# Patient Record
Sex: MALE | Race: WHITE | NOT HISPANIC OR LATINO | URBAN - METROPOLITAN AREA
[De-identification: names, ages, dates, MRNs, and addresses within clinical notes are randomized per-mention and may not be internally consistent; named-entity substitution may affect disease eponyms.]

---

## 2017-02-02 ENCOUNTER — OUTPATIENT (OUTPATIENT)
Dept: OUTPATIENT SERVICES | Facility: HOSPITAL | Age: 67
LOS: 1 days | Discharge: HOME | End: 2017-02-02

## 2017-02-02 DIAGNOSIS — R94.6 ABNORMAL RESULTS OF THYROID FUNCTION STUDIES: ICD-10-CM

## 2017-02-02 DIAGNOSIS — R79.1 ABNORMAL COAGULATION PROFILE: ICD-10-CM

## 2017-02-02 DIAGNOSIS — R78.9 FINDING OF UNSPECIFIED SUBSTANCE, NOT NORMALLY FOUND IN BLOOD: ICD-10-CM

## 2017-02-02 LAB
ALBUMIN SERPL ELPH-MCNC: 4.2 G/DL — SIGNIFICANT CHANGE UP (ref 3.3–5)
ALP SERPL-CCNC: 90 U/L — SIGNIFICANT CHANGE UP (ref 40–120)
ALT FLD-CCNC: 28 U/L — SIGNIFICANT CHANGE UP (ref 12–78)
ANION GAP SERPL CALC-SCNC: 12 MMOL/L — SIGNIFICANT CHANGE UP (ref 5–17)
AST SERPL-CCNC: 16 U/L — SIGNIFICANT CHANGE UP (ref 15–37)
BILIRUB SERPL-MCNC: 0.6 MG/DL — SIGNIFICANT CHANGE UP (ref 0.2–1.2)
BUN SERPL-MCNC: 14 MG/DL — SIGNIFICANT CHANGE UP (ref 7–23)
CALCIUM SERPL-MCNC: 8.7 MG/DL — SIGNIFICANT CHANGE UP (ref 8.5–10.1)
CHLORIDE SERPL-SCNC: 105 MMOL/L — SIGNIFICANT CHANGE UP (ref 96–108)
CHOLEST SERPL-MCNC: 177 MG/DL — SIGNIFICANT CHANGE UP (ref 10–199)
CO2 SERPL-SCNC: 28 MMOL/L — SIGNIFICANT CHANGE UP (ref 22–31)
CREAT SERPL-MCNC: 1.03 MG/DL — SIGNIFICANT CHANGE UP (ref 0.5–1.3)
GLUCOSE SERPL-MCNC: 111 MG/DL — HIGH (ref 70–99)
HBA1C BLD-MCNC: 5.9 % — HIGH (ref 4–5.6)
HDLC SERPL-MCNC: 56 MG/DL — SIGNIFICANT CHANGE UP (ref 40–125)
LIPID PNL WITH DIRECT LDL SERPL: 100 MG/DL — SIGNIFICANT CHANGE UP
POTASSIUM SERPL-MCNC: 3.9 MMOL/L — SIGNIFICANT CHANGE UP (ref 3.5–5.3)
POTASSIUM SERPL-SCNC: 3.9 MMOL/L — SIGNIFICANT CHANGE UP (ref 3.5–5.3)
PROT SERPL-MCNC: 7.5 GM/DL — SIGNIFICANT CHANGE UP (ref 6–8.3)
SODIUM SERPL-SCNC: 145 MMOL/L — SIGNIFICANT CHANGE UP (ref 135–145)
T3FREE SERPL-MCNC: 3.29 PG/ML — SIGNIFICANT CHANGE UP (ref 1.8–4.6)
T4 FREE SERPL-MCNC: 1.2 NG/DL — SIGNIFICANT CHANGE UP (ref 0.9–1.8)
TOTAL CHOLESTEROL/HDL RATIO MEASUREMENT: 3.2 RATIO — LOW (ref 3.4–9.6)
TRIGL SERPL-MCNC: 103 MG/DL — SIGNIFICANT CHANGE UP (ref 10–149)
TSH SERPL-MCNC: 2.47 UIU/ML — SIGNIFICANT CHANGE UP (ref 0.36–3.74)

## 2017-03-31 ENCOUNTER — APPOINTMENT (OUTPATIENT)
Dept: PULMONOLOGY | Facility: CLINIC | Age: 67
End: 2017-03-31

## 2017-03-31 PROBLEM — Z00.00 ENCOUNTER FOR PREVENTIVE HEALTH EXAMINATION: Status: ACTIVE | Noted: 2017-03-31

## 2017-07-20 ENCOUNTER — APPOINTMENT (OUTPATIENT)
Dept: PULMONOLOGY | Facility: CLINIC | Age: 67
End: 2017-07-20

## 2017-07-20 VITALS
DIASTOLIC BLOOD PRESSURE: 90 MMHG | RESPIRATION RATE: 14 BRPM | HEART RATE: 122 BPM | BODY MASS INDEX: 28.74 KG/M2 | OXYGEN SATURATION: 97 % | HEIGHT: 70.5 IN | WEIGHT: 203 LBS | TEMPERATURE: 97.8 F | SYSTOLIC BLOOD PRESSURE: 150 MMHG

## 2017-07-20 DIAGNOSIS — Z84.2 FAMILY HISTORY OF OTHER DISEASES OF THE GENITOURINARY SYSTEM: ICD-10-CM

## 2017-07-20 DIAGNOSIS — Z78.9 OTHER SPECIFIED HEALTH STATUS: ICD-10-CM

## 2017-07-20 DIAGNOSIS — Z86.39 PERSONAL HISTORY OF OTHER ENDOCRINE, NUTRITIONAL AND METABOLIC DISEASE: ICD-10-CM

## 2017-07-20 DIAGNOSIS — I10 ESSENTIAL (PRIMARY) HYPERTENSION: ICD-10-CM

## 2017-07-20 DIAGNOSIS — G47.33 OBSTRUCTIVE SLEEP APNEA (ADULT) (PEDIATRIC): ICD-10-CM

## 2017-07-20 RX ORDER — LOSARTAN POTASSIUM 100 MG/1
100 TABLET, FILM COATED ORAL
Refills: 0 | Status: ACTIVE | COMMUNITY

## 2017-07-20 RX ORDER — ROSUVASTATIN CALCIUM 10 MG/1
10 TABLET, FILM COATED ORAL
Refills: 0 | Status: ACTIVE | COMMUNITY

## 2017-09-29 ENCOUNTER — APPOINTMENT (OUTPATIENT)
Dept: SLEEP CENTER | Facility: CLINIC | Age: 67
End: 2017-09-29
Payer: MEDICARE

## 2017-09-29 PROCEDURE — ZZZZZ: CPT

## 2017-10-27 ENCOUNTER — APPOINTMENT (OUTPATIENT)
Dept: SLEEP CENTER | Facility: CLINIC | Age: 67
End: 2017-10-27
Payer: COMMERCIAL

## 2017-10-27 PROCEDURE — G0399: CPT | Mod: 26

## 2017-10-28 ENCOUNTER — OUTPATIENT (OUTPATIENT)
Dept: OUTPATIENT SERVICES | Facility: HOSPITAL | Age: 67
LOS: 1 days | End: 2017-10-28
Payer: COMMERCIAL

## 2017-10-28 PROCEDURE — G0399: CPT

## 2017-10-31 ENCOUNTER — RESULT CHARGE (OUTPATIENT)
Age: 67
End: 2017-10-31

## 2017-10-31 ENCOUNTER — RESULT REVIEW (OUTPATIENT)
Age: 67
End: 2017-10-31

## 2017-11-01 DIAGNOSIS — G47.33 OBSTRUCTIVE SLEEP APNEA (ADULT) (PEDIATRIC): ICD-10-CM

## 2018-08-17 ENCOUNTER — OUTPATIENT (OUTPATIENT)
Dept: OUTPATIENT SERVICES | Facility: HOSPITAL | Age: 68
LOS: 1 days | Discharge: HOME | End: 2018-08-17

## 2018-08-17 DIAGNOSIS — F41.9 ANXIETY DISORDER, UNSPECIFIED: ICD-10-CM

## 2018-08-17 DIAGNOSIS — R73.01 IMPAIRED FASTING GLUCOSE: ICD-10-CM

## 2018-08-17 DIAGNOSIS — E55.9 VITAMIN D DEFICIENCY, UNSPECIFIED: ICD-10-CM

## 2018-08-17 DIAGNOSIS — I10 ESSENTIAL (PRIMARY) HYPERTENSION: ICD-10-CM

## 2018-08-17 DIAGNOSIS — E78.4 OTHER HYPERLIPIDEMIA: ICD-10-CM

## 2018-08-17 DIAGNOSIS — Z79.899 OTHER LONG TERM (CURRENT) DRUG THERAPY: ICD-10-CM

## 2018-08-17 DIAGNOSIS — R35.1 NOCTURIA: ICD-10-CM

## 2018-08-17 LAB
24R-OH-CALCIDIOL SERPL-MCNC: 81.1 NG/ML — HIGH (ref 30–80)
ALBUMIN SERPL ELPH-MCNC: 3.9 G/DL — SIGNIFICANT CHANGE UP (ref 3.3–5)
ALP SERPL-CCNC: 83 U/L — SIGNIFICANT CHANGE UP (ref 40–120)
ALT FLD-CCNC: 32 U/L — SIGNIFICANT CHANGE UP (ref 12–78)
ANION GAP SERPL CALC-SCNC: 9 MMOL/L — SIGNIFICANT CHANGE UP (ref 5–17)
AST SERPL-CCNC: 9 U/L — LOW (ref 15–37)
BASOPHILS # BLD AUTO: 0.1 K/UL — SIGNIFICANT CHANGE UP (ref 0–0.2)
BASOPHILS NFR BLD AUTO: 1.1 % — SIGNIFICANT CHANGE UP (ref 0–2)
BILIRUB SERPL-MCNC: 0.8 MG/DL — SIGNIFICANT CHANGE UP (ref 0.2–1.2)
BUN SERPL-MCNC: 11 MG/DL — SIGNIFICANT CHANGE UP (ref 7–23)
CALCIUM SERPL-MCNC: 8.8 MG/DL — SIGNIFICANT CHANGE UP (ref 8.5–10.1)
CHLORIDE SERPL-SCNC: 106 MMOL/L — SIGNIFICANT CHANGE UP (ref 96–108)
CHOLEST SERPL-MCNC: 220 MG/DL — HIGH (ref 10–199)
CO2 SERPL-SCNC: 29 MMOL/L — SIGNIFICANT CHANGE UP (ref 22–31)
CREAT SERPL-MCNC: 1.06 MG/DL — SIGNIFICANT CHANGE UP (ref 0.5–1.3)
EOSINOPHIL # BLD AUTO: 0.32 K/UL — SIGNIFICANT CHANGE UP (ref 0–0.5)
EOSINOPHIL NFR BLD AUTO: 3.5 % — SIGNIFICANT CHANGE UP (ref 0–6)
GGT SERPL-CCNC: 32 U/L — SIGNIFICANT CHANGE UP (ref 9–50)
GLUCOSE SERPL-MCNC: 98 MG/DL — SIGNIFICANT CHANGE UP (ref 70–99)
HBA1C BLD-MCNC: 6.2 % — HIGH (ref 4–5.6)
HCT VFR BLD CALC: 49.5 % — SIGNIFICANT CHANGE UP (ref 39–50)
HDLC SERPL-MCNC: 47 MG/DL — SIGNIFICANT CHANGE UP
HGB BLD-MCNC: 16.5 G/DL — SIGNIFICANT CHANGE UP (ref 13–17)
IMM GRANULOCYTES NFR BLD AUTO: 0.2 % — SIGNIFICANT CHANGE UP (ref 0–1.5)
LIPID PNL WITH DIRECT LDL SERPL: 138 MG/DL — HIGH
LYMPHOCYTES # BLD AUTO: 1.7 K/UL — SIGNIFICANT CHANGE UP (ref 1–3.3)
LYMPHOCYTES # BLD AUTO: 18.6 % — SIGNIFICANT CHANGE UP (ref 13–44)
MCHC RBC-ENTMCNC: 29 PG — SIGNIFICANT CHANGE UP (ref 27–34)
MCHC RBC-ENTMCNC: 33.3 GM/DL — SIGNIFICANT CHANGE UP (ref 32–36)
MCV RBC AUTO: 87 FL — SIGNIFICANT CHANGE UP (ref 80–100)
MONOCYTES # BLD AUTO: 0.78 K/UL — SIGNIFICANT CHANGE UP (ref 0–0.9)
MONOCYTES NFR BLD AUTO: 8.5 % — SIGNIFICANT CHANGE UP (ref 2–14)
NEUTROPHILS # BLD AUTO: 6.23 K/UL — SIGNIFICANT CHANGE UP (ref 1.8–7.4)
NEUTROPHILS NFR BLD AUTO: 68.1 % — SIGNIFICANT CHANGE UP (ref 43–77)
NRBC # BLD: 0 /100 WBCS — SIGNIFICANT CHANGE UP (ref 0–0)
PLATELET # BLD AUTO: 374 K/UL — SIGNIFICANT CHANGE UP (ref 150–400)
POTASSIUM SERPL-MCNC: 4.1 MMOL/L — SIGNIFICANT CHANGE UP (ref 3.5–5.3)
POTASSIUM SERPL-SCNC: 4.1 MMOL/L — SIGNIFICANT CHANGE UP (ref 3.5–5.3)
PROT SERPL-MCNC: 7.7 GM/DL — SIGNIFICANT CHANGE UP (ref 6–8.3)
PSA FREE MFR FLD: 4.46 NG/ML — HIGH (ref 0–4)
RBC # BLD: 5.69 M/UL — SIGNIFICANT CHANGE UP (ref 4.2–5.8)
RBC # FLD: 13.8 % — SIGNIFICANT CHANGE UP (ref 10.3–14.5)
SODIUM SERPL-SCNC: 144 MMOL/L — SIGNIFICANT CHANGE UP (ref 135–145)
TOTAL CHOLESTEROL/HDL RATIO MEASUREMENT: 4.7 RATIO — SIGNIFICANT CHANGE UP (ref 3.4–9.6)
TRIGL SERPL-MCNC: 174 MG/DL — HIGH (ref 10–149)
TSH SERPL-MCNC: 1.57 UU/ML — SIGNIFICANT CHANGE UP (ref 0.36–3.74)
WBC # BLD: 9.15 K/UL — SIGNIFICANT CHANGE UP (ref 3.8–10.5)
WBC # FLD AUTO: 9.15 K/UL — SIGNIFICANT CHANGE UP (ref 3.8–10.5)

## 2020-02-07 ENCOUNTER — OUTPATIENT (OUTPATIENT)
Dept: OUTPATIENT SERVICES | Facility: HOSPITAL | Age: 70
LOS: 1 days | Discharge: HOME | End: 2020-02-07

## 2020-02-07 DIAGNOSIS — R79.89 OTHER SPECIFIED ABNORMAL FINDINGS OF BLOOD CHEMISTRY: ICD-10-CM

## 2020-02-07 DIAGNOSIS — R68.89 OTHER GENERAL SYMPTOMS AND SIGNS: ICD-10-CM

## 2020-04-25 ENCOUNTER — MESSAGE (OUTPATIENT)
Age: 70
End: 2020-04-25

## 2020-05-07 LAB
SARS-COV-2 IGG SERPL IA-ACNC: <0.1 INDEX
SARS-COV-2 IGG SERPL QL IA: NEGATIVE

## 2022-08-03 ENCOUNTER — APPOINTMENT (OUTPATIENT)
Dept: SURGERY | Facility: CLINIC | Age: 72
End: 2022-08-03

## 2022-08-03 VITALS
TEMPERATURE: 97.9 F | BODY MASS INDEX: 29.96 KG/M2 | DIASTOLIC BLOOD PRESSURE: 120 MMHG | SYSTOLIC BLOOD PRESSURE: 190 MMHG | HEIGHT: 71 IN | RESPIRATION RATE: 16 BRPM | OXYGEN SATURATION: 98 % | HEART RATE: 124 BPM | WEIGHT: 214 LBS

## 2022-08-03 DIAGNOSIS — Z83.3 FAMILY HISTORY OF DIABETES MELLITUS: ICD-10-CM

## 2022-08-03 DIAGNOSIS — Z84.1 FAMILY HISTORY OF DISORDERS OF KIDNEY AND URETER: ICD-10-CM

## 2022-08-03 DIAGNOSIS — Z82.3 FAMILY HISTORY OF STROKE: ICD-10-CM

## 2022-08-03 DIAGNOSIS — Z80.3 FAMILY HISTORY OF MALIGNANT NEOPLASM OF BREAST: ICD-10-CM

## 2022-08-03 PROCEDURE — 99204 OFFICE O/P NEW MOD 45 MIN: CPT

## 2022-08-03 NOTE — PHYSICAL EXAM
[de-identified] : Obese, soft, nondistended, nontender. No palpable mass or organomegaly. Well-healed upper midline surgical scar. Right groin- no palpable hernia. Left groin- large, bowel containing scrotal hernia which is nontender and only partially reducible.

## 2022-08-03 NOTE — HISTORY OF PRESENT ILLNESS
[de-identified] : Ashley is a 70 y/o male here for consultation of left inguinal hernia.  [de-identified] : 71-year-old male with known left inguinal hernia for almost 20 years. The hernia has progressively enlarged over time, more so within the past 2-3 years. Patient has intermittent, mild local discomfort no generalized abdominal symptoms. Following cholecystectomy many years ago he developed loose bowel movements but within the past several years he has actually transitioned to a degree of constipation.

## 2022-08-03 NOTE — PLAN
[FreeTextEntry1] : Advised elective, inpatient mesh repair via a lower midline approach. Patient to begin weight loss regimen and return in one month for recheck.

## 2022-09-14 ENCOUNTER — APPOINTMENT (OUTPATIENT)
Dept: SURGERY | Facility: CLINIC | Age: 72
End: 2022-09-14

## 2022-09-14 VITALS — SYSTOLIC BLOOD PRESSURE: 190 MMHG | DIASTOLIC BLOOD PRESSURE: 116 MMHG

## 2022-09-14 VITALS
HEART RATE: 134 BPM | SYSTOLIC BLOOD PRESSURE: 183 MMHG | BODY MASS INDEX: 29.12 KG/M2 | WEIGHT: 208 LBS | HEIGHT: 71 IN | DIASTOLIC BLOOD PRESSURE: 133 MMHG | TEMPERATURE: 96.7 F | RESPIRATION RATE: 16 BRPM | OXYGEN SATURATION: 99 %

## 2022-09-14 PROCEDURE — 99212 OFFICE O/P EST SF 10 MIN: CPT

## 2022-09-14 NOTE — HISTORY OF PRESENT ILLNESS
[de-identified] : Ashley is a 70 y/o male here for follow up. Last seen on 08/03/22- partially incarcerated left scrotal hernia. Advised elective, inpatient mesh repair via a lower midline approach. Patient to begin weight loss regimen and return in one month for recheck.  [de-identified] : Since last visit here patient has experienced intermittent, mild left groin discomfort but no acute pain. Tolerating diet without difficulty and maintaining regular bowel activity with use of MiraLax. Has been dieting and has lost approximately 6 pounds since the last visit.

## 2022-09-14 NOTE — ASSESSMENT
[FreeTextEntry1] : 71-year-old obese male with partially chronically incarcerated left scrotal hernia.

## 2022-09-14 NOTE — PHYSICAL EXAM
[de-identified] : Obese, soft, nondistended, nontender. Left scrotal hernia soft and nontender and slightly more reducible than at last exam but still partially incarcerated.

## 2022-10-12 ENCOUNTER — APPOINTMENT (OUTPATIENT)
Dept: SURGERY | Facility: CLINIC | Age: 72
End: 2022-10-12

## 2022-10-12 VITALS
TEMPERATURE: 96.9 F | HEART RATE: 134 BPM | RESPIRATION RATE: 16 BRPM | HEIGHT: 71 IN | BODY MASS INDEX: 28.56 KG/M2 | WEIGHT: 204 LBS | OXYGEN SATURATION: 98 % | DIASTOLIC BLOOD PRESSURE: 89 MMHG | SYSTOLIC BLOOD PRESSURE: 145 MMHG

## 2022-10-12 VITALS
WEIGHT: 202 LBS | DIASTOLIC BLOOD PRESSURE: 89 MMHG | HEIGHT: 71 IN | TEMPERATURE: 96.9 F | HEART RATE: 134 BPM | BODY MASS INDEX: 28.28 KG/M2 | SYSTOLIC BLOOD PRESSURE: 145 MMHG | OXYGEN SATURATION: 98 % | RESPIRATION RATE: 16 BRPM

## 2022-10-12 PROCEDURE — 99212 OFFICE O/P EST SF 10 MIN: CPT

## 2022-10-12 NOTE — PHYSICAL EXAM
[de-identified] : Obese, soft, nondistended, nontender. No palpable mass or organomegaly. Left groin- very large inguinal hernia though seemingly not quite as large as on prior exam. Hernia still not able to be completely reduced but somewhat more so than on last attempt.

## 2022-10-12 NOTE — HISTORY OF PRESENT ILLNESS
[de-identified] : Ashley is a 72 y/o male here for his one month follow up. [de-identified] : Since last visit here patient has lost 5-7 pounds. No complaint of abdominal or groin pain. Notes some constipation which has been readily controlled with use of MiraLax.

## 2022-10-12 NOTE — ASSESSMENT
[FreeTextEntry1] : 71-year-old obese male with large left scrotal hernia who has been pursuing weight loss program with some success.

## 2022-10-12 NOTE — PLAN
[FreeTextEntry1] : Patient to continue weight loss regimen and strongly advised to begin a program of physical activity (suggested daily walking) and then to return in one month for reevaluation.

## 2022-11-16 ENCOUNTER — APPOINTMENT (OUTPATIENT)
Dept: SURGERY | Facility: CLINIC | Age: 72
End: 2022-11-16

## 2022-11-16 VITALS
TEMPERATURE: 96.3 F | OXYGEN SATURATION: 100 % | DIASTOLIC BLOOD PRESSURE: 92 MMHG | WEIGHT: 200 LBS | HEIGHT: 71 IN | BODY MASS INDEX: 28 KG/M2 | HEART RATE: 108 BPM | RESPIRATION RATE: 16 BRPM | SYSTOLIC BLOOD PRESSURE: 160 MMHG

## 2022-11-16 PROCEDURE — 99212 OFFICE O/P EST SF 10 MIN: CPT

## 2022-11-16 NOTE — HISTORY OF PRESENT ILLNESS
[de-identified] : Ashley is a 72 year old male here for a follow up visit for large left scrotal hernia\par \par  [de-identified] : Since last visit here patient has lost another 4 pounds through dieting and some increase in physical activity. No complaint of pain referable to the hernia. Bowel function remains good with daily MiraLax.

## 2022-11-16 NOTE — ASSESSMENT
[FreeTextEntry1] : 72-year-old obese male with large left scrotal hernia progressing well on weight loss regimen.

## 2022-11-16 NOTE — PHYSICAL EXAM
[de-identified] : Soft, nondistended, nontender. Left scrotal hernia appears perhaps slightly smaller than on prior exam and, although not fully reducible, seems to be more so than at last attempt.

## 2022-12-14 ENCOUNTER — APPOINTMENT (OUTPATIENT)
Dept: SURGERY | Facility: CLINIC | Age: 72
End: 2022-12-14

## 2022-12-14 VITALS
HEIGHT: 71 IN | BODY MASS INDEX: 27.86 KG/M2 | OXYGEN SATURATION: 96 % | TEMPERATURE: 96.6 F | DIASTOLIC BLOOD PRESSURE: 100 MMHG | SYSTOLIC BLOOD PRESSURE: 151 MMHG | HEART RATE: 98 BPM | WEIGHT: 199 LBS | RESPIRATION RATE: 16 BRPM

## 2022-12-14 DIAGNOSIS — Z86.79 PERSONAL HISTORY OF OTHER DISEASES OF THE CIRCULATORY SYSTEM: ICD-10-CM

## 2022-12-14 DIAGNOSIS — Z86.39 PERSONAL HISTORY OF OTHER ENDOCRINE, NUTRITIONAL AND METABOLIC DISEASE: ICD-10-CM

## 2022-12-14 DIAGNOSIS — Z87.19 PERSONAL HISTORY OF OTHER DISEASES OF THE DIGESTIVE SYSTEM: ICD-10-CM

## 2022-12-14 DIAGNOSIS — K46.9 UNSPECIFIED ABDOMINAL HERNIA W/OUT OBSTRUCTION OR GANGRENE: ICD-10-CM

## 2022-12-14 PROCEDURE — 99212 OFFICE O/P EST SF 10 MIN: CPT

## 2022-12-14 RX ORDER — FLUOXETINE HCL 10 MG
TABLET ORAL
Refills: 0 | Status: DISCONTINUED | COMMUNITY
End: 2022-12-14

## 2022-12-14 RX ORDER — LANSOPRAZOLE 15 MG
15 SUSPENSION,DELAYED RELEASE,RECONST. ORAL
Refills: 0 | Status: ACTIVE | COMMUNITY

## 2022-12-14 NOTE — HISTORY OF PRESENT ILLNESS
[de-identified] : Ashley is a 72 year old male here for a follow up visit for large left scrotal hernia.  [de-identified] : Since last visit here patient has had no additional success with weight loss. Additionally he has been experiencing intermittent crampy abdominal pain usually alleviated by bowel movements or passage of flatus. No nausea or vomiting and food intake remains normal. Continues to treat constipation with MiraLax as needed.

## 2022-12-14 NOTE — PLAN
[FreeTextEntry1] : To proceed with elective repair in ambulatory OR. Discussed with patient and brother nature of, indications for and risks/benefits of surgery.

## 2022-12-14 NOTE — PHYSICAL EXAM
[de-identified] : Soft, nondistended, nontender. No palpable mass or organomegaly. Left scrotal hernia unchanged from prior exam. Remains partially reducible and nontender.

## 2023-01-18 ENCOUNTER — OUTPATIENT (OUTPATIENT)
Dept: OUTPATIENT SERVICES | Facility: HOSPITAL | Age: 73
LOS: 1 days | End: 2023-01-18
Payer: COMMERCIAL

## 2023-01-18 VITALS
HEIGHT: 70 IN | TEMPERATURE: 97 F | RESPIRATION RATE: 16 BRPM | OXYGEN SATURATION: 97 % | DIASTOLIC BLOOD PRESSURE: 87 MMHG | WEIGHT: 197.09 LBS | HEART RATE: 99 BPM | SYSTOLIC BLOOD PRESSURE: 128 MMHG

## 2023-01-18 DIAGNOSIS — Z01.818 ENCOUNTER FOR OTHER PREPROCEDURAL EXAMINATION: ICD-10-CM

## 2023-01-18 DIAGNOSIS — Z90.49 ACQUIRED ABSENCE OF OTHER SPECIFIED PARTS OF DIGESTIVE TRACT: Chronic | ICD-10-CM

## 2023-01-18 DIAGNOSIS — K40.90 UNILATERAL INGUINAL HERNIA, WITHOUT OBSTRUCTION OR GANGRENE, NOT SPECIFIED AS RECURRENT: ICD-10-CM

## 2023-01-18 DIAGNOSIS — G47.33 OBSTRUCTIVE SLEEP APNEA (ADULT) (PEDIATRIC): ICD-10-CM

## 2023-01-18 LAB
A1C WITH ESTIMATED AVERAGE GLUCOSE RESULT: 6.1 % — HIGH (ref 4–5.6)
ANION GAP SERPL CALC-SCNC: 14 MMOL/L — SIGNIFICANT CHANGE UP (ref 5–17)
BUN SERPL-MCNC: 14 MG/DL — SIGNIFICANT CHANGE UP (ref 7–23)
CALCIUM SERPL-MCNC: 10.2 MG/DL — SIGNIFICANT CHANGE UP (ref 8.4–10.5)
CHLORIDE SERPL-SCNC: 103 MMOL/L — SIGNIFICANT CHANGE UP (ref 96–108)
CO2 SERPL-SCNC: 24 MMOL/L — SIGNIFICANT CHANGE UP (ref 22–31)
CREAT SERPL-MCNC: 0.97 MG/DL — SIGNIFICANT CHANGE UP (ref 0.5–1.3)
EGFR: 83 ML/MIN/1.73M2 — SIGNIFICANT CHANGE UP
ESTIMATED AVERAGE GLUCOSE: 128 MG/DL — HIGH (ref 68–114)
GLUCOSE SERPL-MCNC: 103 MG/DL — HIGH (ref 70–99)
HCT VFR BLD CALC: 53.6 % — HIGH (ref 39–50)
HGB BLD-MCNC: 17.7 G/DL — HIGH (ref 13–17)
MCHC RBC-ENTMCNC: 28.4 PG — SIGNIFICANT CHANGE UP (ref 27–34)
MCHC RBC-ENTMCNC: 33 GM/DL — SIGNIFICANT CHANGE UP (ref 32–36)
MCV RBC AUTO: 85.9 FL — SIGNIFICANT CHANGE UP (ref 80–100)
NRBC # BLD: 0 /100 WBCS — SIGNIFICANT CHANGE UP (ref 0–0)
PLATELET # BLD AUTO: 482 K/UL — HIGH (ref 150–400)
POTASSIUM SERPL-MCNC: 4.2 MMOL/L — SIGNIFICANT CHANGE UP (ref 3.5–5.3)
POTASSIUM SERPL-SCNC: 4.2 MMOL/L — SIGNIFICANT CHANGE UP (ref 3.5–5.3)
RBC # BLD: 6.24 M/UL — HIGH (ref 4.2–5.8)
RBC # FLD: 13.5 % — SIGNIFICANT CHANGE UP (ref 10.3–14.5)
SODIUM SERPL-SCNC: 141 MMOL/L — SIGNIFICANT CHANGE UP (ref 135–145)
WBC # BLD: 8.37 K/UL — SIGNIFICANT CHANGE UP (ref 3.8–10.5)
WBC # FLD AUTO: 8.37 K/UL — SIGNIFICANT CHANGE UP (ref 3.8–10.5)

## 2023-01-18 PROCEDURE — 85027 COMPLETE CBC AUTOMATED: CPT

## 2023-01-18 PROCEDURE — 80048 BASIC METABOLIC PNL TOTAL CA: CPT

## 2023-01-18 PROCEDURE — G0463: CPT

## 2023-01-18 PROCEDURE — 83036 HEMOGLOBIN GLYCOSYLATED A1C: CPT

## 2023-01-18 RX ORDER — SODIUM CHLORIDE 9 MG/ML
1000 INJECTION, SOLUTION INTRAVENOUS
Refills: 0 | Status: DISCONTINUED | OUTPATIENT
Start: 2023-02-02 | End: 2023-02-16

## 2023-01-18 NOTE — H&P PST ADULT - PROBLEM SELECTOR PLAN 1
Left Scrotal hernia repair Morris  PST instructions provided, surgical scrub given, patient verbalized understanding   CBC, BMP, HgA1c collected and sent   Covid PCR near home 1/30  Will schedule PCP eval ( no recent visit , sedentary lifestyle )

## 2023-01-18 NOTE — H&P PST ADULT - LAST ECHOCARDIOGRAM
many year ago was evaluated by cardiologists Dr. Devon Cormier , known Right bundle branch block (RBBB) with left anterior hemiblock , asymptomatic

## 2023-01-18 NOTE — H&P PST ADULT - NSICDXPASTMEDICALHX_GEN_ALL_CORE_FT
PAST MEDICAL HISTORY:  Borderline diabetes mellitus     BPH (benign prostatic hyperplasia)     Hyperlipidemia     Hypertension     Left inguinal hernia     JUAN FRANCISCO (obstructive sleep apnea)     Right bundle branch block (RBBB) with left anterior hemiblock

## 2023-01-18 NOTE — H&P PST ADULT - HISTORY OF PRESENT ILLNESS
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 Chris Mcguire. Patient reports scrotal discomfort, no pain, no obstruction. Denies changes in bowel regimen, taking Miralax every other day.  Denies fever, chills, no acute complaints. Denies Covid exposure. Covid PCR 1/30 near home. Ambulating without assistance.

## 2023-01-18 NOTE — H&P PST ADULT - ASSESSMENT
DASI: 4.64 able to walk, climb some stairs, sedentary lifestyle, not active   Symptoms : Denies SOB, ENNIS, palpitations  Airway : no airway abnormalities , denies prior anesthesia complications   Mallampati : II  Denies loose teeth     Avery abrasion risk : Denies

## 2023-01-18 NOTE — H&P PST ADULT - SKIN/BREAST COMMENTS
bumps and healing erosions on both legs shins . No signs of infection . As per patient healing. Patient reports mild home injury ,moving furniture. Denies falls.

## 2023-01-24 PROBLEM — G47.33 OBSTRUCTIVE SLEEP APNEA (ADULT) (PEDIATRIC): Chronic | Status: ACTIVE | Noted: 2023-01-18

## 2023-01-24 PROBLEM — I45.2 BIFASCICULAR BLOCK: Chronic | Status: ACTIVE | Noted: 2023-01-18

## 2023-01-24 PROBLEM — E78.5 HYPERLIPIDEMIA, UNSPECIFIED: Chronic | Status: ACTIVE | Noted: 2023-01-18

## 2023-01-24 PROBLEM — I10 ESSENTIAL (PRIMARY) HYPERTENSION: Chronic | Status: ACTIVE | Noted: 2023-01-18

## 2023-01-24 PROBLEM — K40.90 UNILATERAL INGUINAL HERNIA, WITHOUT OBSTRUCTION OR GANGRENE, NOT SPECIFIED AS RECURRENT: Chronic | Status: ACTIVE | Noted: 2023-01-18

## 2023-01-24 PROBLEM — N40.0 BENIGN PROSTATIC HYPERPLASIA WITHOUT LOWER URINARY TRACT SYMPTOMS: Chronic | Status: ACTIVE | Noted: 2023-01-18

## 2023-01-24 PROBLEM — R73.03 PREDIABETES: Chronic | Status: ACTIVE | Noted: 2023-01-18

## 2023-01-26 DIAGNOSIS — Z01.818 ENCOUNTER FOR OTHER PREPROCEDURAL EXAMINATION: ICD-10-CM

## 2023-02-01 ENCOUNTER — TRANSCRIPTION ENCOUNTER (OUTPATIENT)
Age: 73
End: 2023-02-01

## 2023-02-01 LAB — SARS-COV-2 N GENE NPH QL NAA+PROBE: NOT DETECTED

## 2023-02-02 ENCOUNTER — OUTPATIENT (OUTPATIENT)
Dept: OUTPATIENT SERVICES | Facility: HOSPITAL | Age: 73
LOS: 1 days | End: 2023-02-02
Payer: COMMERCIAL

## 2023-02-02 ENCOUNTER — TRANSCRIPTION ENCOUNTER (OUTPATIENT)
Age: 73
End: 2023-02-02

## 2023-02-02 ENCOUNTER — RESULT REVIEW (OUTPATIENT)
Age: 73
End: 2023-02-02

## 2023-02-02 ENCOUNTER — APPOINTMENT (OUTPATIENT)
Dept: SURGERY | Facility: HOSPITAL | Age: 73
End: 2023-02-02
Payer: MEDICARE

## 2023-02-02 VITALS
HEIGHT: 70 IN | TEMPERATURE: 97 F | WEIGHT: 197.09 LBS | OXYGEN SATURATION: 97 % | RESPIRATION RATE: 16 BRPM | SYSTOLIC BLOOD PRESSURE: 161 MMHG | HEART RATE: 109 BPM | DIASTOLIC BLOOD PRESSURE: 97 MMHG

## 2023-02-02 VITALS
RESPIRATION RATE: 17 BRPM | SYSTOLIC BLOOD PRESSURE: 121 MMHG | DIASTOLIC BLOOD PRESSURE: 68 MMHG | TEMPERATURE: 98 F | OXYGEN SATURATION: 97 % | HEART RATE: 93 BPM

## 2023-02-02 DIAGNOSIS — Z01.818 ENCOUNTER FOR OTHER PREPROCEDURAL EXAMINATION: ICD-10-CM

## 2023-02-02 DIAGNOSIS — K40.90 UNILATERAL INGUINAL HERNIA, WITHOUT OBSTRUCTION OR GANGRENE, NOT SPECIFIED AS RECURRENT: ICD-10-CM

## 2023-02-02 DIAGNOSIS — Z90.49 ACQUIRED ABSENCE OF OTHER SPECIFIED PARTS OF DIGESTIVE TRACT: Chronic | ICD-10-CM

## 2023-02-02 LAB — GLUCOSE BLDC GLUCOMTR-MCNC: 109 MG/DL — HIGH (ref 70–99)

## 2023-02-02 PROCEDURE — 49525 REPAIR ING HERNIA SLIDING: CPT | Mod: LT

## 2023-02-02 PROCEDURE — 49505 PRP I/HERN INIT REDUC >5 YR: CPT | Mod: LT

## 2023-02-02 PROCEDURE — C1781: CPT

## 2023-02-02 PROCEDURE — 82962 GLUCOSE BLOOD TEST: CPT

## 2023-02-02 PROCEDURE — 88302 TISSUE EXAM BY PATHOLOGIST: CPT | Mod: 26

## 2023-02-02 PROCEDURE — 88302 TISSUE EXAM BY PATHOLOGIST: CPT

## 2023-02-02 PROCEDURE — C9399: CPT

## 2023-02-02 DEVICE — MESH HERNIA VENTRIO OVAL 14X18CM LG: Type: IMPLANTABLE DEVICE | Status: FUNCTIONAL

## 2023-02-02 RX ORDER — OXYCODONE 5 MG/1
5 TABLET ORAL
Qty: 10 | Refills: 0 | Status: ACTIVE | COMMUNITY
Start: 2023-02-02 | End: 1900-01-01

## 2023-02-02 RX ORDER — LIDOCAINE HCL 20 MG/ML
0.2 VIAL (ML) INJECTION ONCE
Refills: 0 | Status: COMPLETED | OUTPATIENT
Start: 2023-02-02 | End: 2023-02-02

## 2023-02-02 RX ORDER — HYDROMORPHONE HYDROCHLORIDE 2 MG/ML
0.25 INJECTION INTRAMUSCULAR; INTRAVENOUS; SUBCUTANEOUS
Refills: 0 | Status: DISCONTINUED | OUTPATIENT
Start: 2023-02-02 | End: 2023-02-02

## 2023-02-02 RX ORDER — ONDANSETRON 8 MG/1
4 TABLET, FILM COATED ORAL ONCE
Refills: 0 | Status: DISCONTINUED | OUTPATIENT
Start: 2023-02-02 | End: 2023-02-16

## 2023-02-02 RX ORDER — SODIUM CHLORIDE 9 MG/ML
1000 INJECTION, SOLUTION INTRAVENOUS
Refills: 0 | Status: DISCONTINUED | OUTPATIENT
Start: 2023-02-02 | End: 2023-02-16

## 2023-02-02 RX ORDER — OXYCODONE HYDROCHLORIDE 5 MG/1
5 TABLET ORAL ONCE
Refills: 0 | Status: DISCONTINUED | OUTPATIENT
Start: 2023-02-02 | End: 2023-02-02

## 2023-02-02 RX ADMIN — SODIUM CHLORIDE 100 MILLILITER(S): 9 INJECTION, SOLUTION INTRAVENOUS at 05:44

## 2023-02-02 NOTE — ASU DISCHARGE PLAN (ADULT/PEDIATRIC) - ASU DC SPECIAL INSTRUCTIONSFT
PAIN CONTROL: You may take 650 mg of Tylenol every 4-6 hours. Do not exceed 4 grams of Tylenol daily. You may take 400-600 mg of Ibuprofen every 4-6 hours. Do not exceed 2400 mg of Ibuprofen daily. Take oxycodone as needed for severe pain, one tab every 6 hours. Do not exceed 4 tabs daily.  WOUND CARE: Keep dressing dry. You have bandages overlying your incisions called steri strips. Do not remove the steri strips. They will fall off on their own and if not, they will be removed in the office. After surgery, some blood may escape from under the tape, which is normal.   DRAIN: You will be discharged with a IBAN drain. You will need to empty it and record outputs accurately. This will be taught to you by the nursing staff. Please do not remove the IBAN drain. It will be removed in the office. Please bring to the office accurate records of output.   BATHING: Do not shower until you follow up with Dr. Mcguire in the office.  ACTIVITY: No heavy lifting or straining. Otherwise, you may return to your usual level of physical activity. If you are taking narcotic pain medication (such as oxycodone), do NOT drive a car, operate machinery or make important decisions.  DIET: Return to your usual diet.  NOTIFY YOUR SURGEON IF: You have any bleeding that does not stop, any pus draining from your wound, any fever (over 100.4 F) or chills, persistent nausea/vomiting, persistent diarrhea, or if your pain is not controlled on your discharge pain medications.  FOLLOW-UP:  1. Follow up with Dr. Mcguire next Wednesday.  2. Please follow up with your primary care physician in one week regarding your hospitalization. PAIN CONTROL: You may take 650 mg of Tylenol every 4-6 hours. Do not exceed 4 grams of Tylenol daily. You may take 400-600 mg of Ibuprofen every 4-6 hours. Do not exceed 2400 mg of Ibuprofen daily. Take oxycodone as needed for severe pain, one tab every 6 hours. Do not exceed 4 tabs daily.  ******************************************************************************************   WOUND CARE: Keep dressing dry. You have bandages overlying your incisions called steri strips. Do not remove the steri strips. They will fall off on their own and if not, they will be removed in the office. After surgery, some blood may escape from under the tape, which is normal.   ******************************************************************************************   DRAIN: You will be discharged with a IBAN drain. You will need to empty it and record outputs accurately. This will be taught to you by the nursing staff. Please do not remove the IBAN drain. It will be removed in the office. Please bring to the office accurate records of output.   ******************************************************************************************   BATHING: Do not shower until you follow up with Dr. Mcguire in the office.  ******************************************************************************************   ACTIVITY: No heavy lifting or straining. Otherwise, you may return to your usual level of physical activity. If you are taking narcotic pain medication (such as oxycodone), do NOT drive a car, operate machinery or make important decisions.  ******************************************************************************************   DIET: Return to your usual diet.  ******************************************************************************************   NOTIFY YOUR SURGEON IF: You have any bleeding that does not stop, any pus draining from your wound, any fever (over 100.4 F) or chills, persistent nausea/vomiting, persistent diarrhea, or if your pain is not controlled on your discharge pain medications.  ******************************************************************************************   FOLLOW-UP:  1. Follow up with Dr. Mcguire next Wednesday. 2. Please follow up with your primary care physician in one week regarding your hospitalization.

## 2023-02-02 NOTE — BRIEF OPERATIVE NOTE - OPERATION/FINDINGS
Large left inguinal hernia with direct and indirect defects. Hernia sacs excised. Preperitoneal repair with Ventrio patch.

## 2023-02-02 NOTE — ASU PATIENT PROFILE, ADULT - SUBSTANCE USE COMMENT, PROFILE
Reason For Visit  MAINOR GANNON is an established  patient here today for a chief complaint of ringing in left ear .          History of Present Illness  Px reports left ear tinnitus x 2 mos. No N/V/D. No SOB or chest pain. No change in LOC. Px reports air travel almost every week. Denies any pain in left ear. Denies that ringining keeps patient awake at night. States that noise is \"high pitched and sounds like the residual ringing you would hear after coming out of a bar with loud music, except that the ringing doesn't stop.\" Denies history of attending loud concerts. Denies working in loud environment. Reports that ringing is able to be drowned out if surrounding noise is louder. States that ringing is only in left ear and not the right. States ringing is worse in the morning upon waking. Denies change in head position. Denies wearing gumaro tooth in left ear. No other concerns.      Review of Systems    Const: Normal.   Allergy & Immunology: Normal.   Eyes: Normal.   ENT: Normal.   Neck: Normal.   CV: Normal.   Resp: Normal.   Breast: Normal.   GI: Normal.   : Normal.   Endo: Normal.   Heme/Lymph: Normal.   Musc: Normal.   Neuro: Normal.   Psych: Normal.   Skin: Normal.       Allergies  No Known Drug Allergies    Current Meds   1. Tamiflu 75 MG Oral Capsule; TAKE 1 CAPSULE TWICE DAILY WITH MEALS;   Therapy: [id number] to (Evaluate:[id number])  Requested for: [id number]; Last   Rx:[id number] Ordered    Active Problems  Influenza B (J10.1)    Past Medical History  History of No significant past medical history    Surgical History  no history of surgery    Family History  No pertinent family history    Social History  Non-smoker (Z78.9)    Vitals  Signs   Recorded: 17Aug2018 02:25PM   Height: 6 ft   Weight: 175 lb   BMI Calculated: 23.73  BSA Calculated: 2.01  Systolic: 112  Diastolic: 72  Temperature: 98 F  Heart Rate: 86  Respiration: 14  O2 Saturation: 98    Physical Exam  Constitutional: alert, in no acute distress and  current vital signs reviewed.   Head and Face ACW: atraumatic, no deformities, normocephalic.   Eyes ACW: no discharge, normal conjunctiva and no eyelid swelling.   ENT ACW: normal appearing outer ear, normal appearing nose. examination of the tympanic membrane showed normal landmarks, normal appearing external canal . Lt TM: 1 fluid bubble noted behind left TM. TM clear. Bony landmarks visualized. No erythema. Minimal cerumen bilat without significant obstruction of TM. nasal mucosa moist and pinkno nasal discharge. normal lips. oral mucosa pink and moist, no oral lesions, normal appearing pharynx and normal appearing tongue.   Lymphatic ACW: no cervical lymphadenopathy.   Pulmonary ACW: no respiratory distress, normal respiratory rate and effort and no accessory muscle use. breath sounds clear to auscultation bilaterally.   Cardiovascular: normal rate, no murmurs were heard, regular rhythm, normal S1 and normal S2. edema was not present in the lower extremities.   Neurologic ACW: cranial nerves grossly intact. no sensory deficits noted. no coordination deficits. normal gait. muscle strength and tone were normal.      Assessment  Tinnitus of left ear (H93.12)   · Assessed By: DONAVON REBOLLEDO (Primary Care); Last Assessed: 17 Aug 2018    Plan      Follow up with PCP as needed. Advised for possible ENT follow up for more extensive hearing tests if no change noted with current regimen of Flonase and Claritin. Begin otc Claritin 1/day to dry up some of the fluid in the left ear.  Additional Notes:   Patient expresses understanding of the plan.    Medical compliance with plan discussed and risks of non-compliance reviewed.    If symptoms continue or worsen after 24 hrs, seek immediate care.   Thank you for visiting [facility]. You require a follow-up visit. Please schedule an appointment with your PCP. .       To view an electronic version of your office visit summary, please access your [facility]  Portal account. If you are not currently signed up and you provided us with your email address, please locate the email from \"[facility]\" and follow instructions to activate your account. Or you can visit [email or web address] to submit an online portal request form.              Signatures   Electronically signed by : LUCIANO Diane; Aug 17 2018  2:28PM CST    Electronically signed by : DONAVON REBOLLEDO NP; Aug 17 2018  5:03PM CST     Denies

## 2023-02-02 NOTE — ASU DISCHARGE PLAN (ADULT/PEDIATRIC) - PATIENT BELONGINGS
Semglee ordered at [facility].  Entire message reviewed with Pranay who states he has 2 pens of Basaglar to use then he will switch to Semglee.   Patient's belongings returned

## 2023-02-02 NOTE — ASU DISCHARGE PLAN (ADULT/PEDIATRIC) - NS MD DC FALL RISK RISK
For information on Fall & Injury Prevention, visit: [email or web address] OR  [email or web address] OR  https://www.cdc.gov/steadi/patient.html

## 2023-02-02 NOTE — ASU PATIENT PROFILE, ADULT - HOW PATIENT ADDRESSED, PROFILE
Pt with chronic constipation, on miralax. stopped taking it one week ago because he ran out and it is no longer covereed by insurance. c/o no bm for 5 days, no abd pain, no distention. no urge to go. no fever no chills. he noted an itchy rash on back 2 days. using new laundry service. no new sob (has chronic secondary to lopez) no cp. no other rashes.
Ashley

## 2023-02-02 NOTE — ASU DISCHARGE PLAN (ADULT/PEDIATRIC) - CARE PROVIDER_API CALL
Chris Mcguire)  Surgery  [address], [address]  [place], [place] [id number]  Phone: [phone]  Fax: [phone]  Follow Up Time:

## 2023-02-02 NOTE — ASU DISCHARGE PLAN (ADULT/PEDIATRIC) - NURSING INSTRUCTIONS
OK to take Tylenol/Acetaminophen at 1:30PM TODAY 2/2 (last dose @  7:30AM   in operating room)  for pain and every 6 hours after as needed. OK to take Motrin/Ibuprofen at ANY TIME TODAY 2/2 for pain and every 6 hours after as needed.

## 2023-02-08 ENCOUNTER — APPOINTMENT (OUTPATIENT)
Dept: SURGERY | Facility: CLINIC | Age: 73
End: 2023-02-08
Payer: MEDICARE

## 2023-02-08 VITALS
OXYGEN SATURATION: 100 % | HEIGHT: 71 IN | SYSTOLIC BLOOD PRESSURE: 185 MMHG | HEART RATE: 121 BPM | DIASTOLIC BLOOD PRESSURE: 122 MMHG | TEMPERATURE: 95.7 F | RESPIRATION RATE: 16 BRPM

## 2023-02-08 PROCEDURE — 99212 OFFICE O/P EST SF 10 MIN: CPT

## 2023-02-14 LAB — SURGICAL PATHOLOGY STUDY: SIGNIFICANT CHANGE UP

## 2023-02-22 ENCOUNTER — APPOINTMENT (OUTPATIENT)
Dept: SURGERY | Facility: CLINIC | Age: 73
End: 2023-02-22
Payer: MEDICARE

## 2023-02-22 VITALS
WEIGHT: 199 LBS | SYSTOLIC BLOOD PRESSURE: 198 MMHG | RESPIRATION RATE: 16 BRPM | TEMPERATURE: 96 F | DIASTOLIC BLOOD PRESSURE: 103 MMHG | HEART RATE: 98 BPM | BODY MASS INDEX: 27.86 KG/M2 | OXYGEN SATURATION: 99 % | HEIGHT: 71 IN

## 2023-02-22 PROCEDURE — 99024 POSTOP FOLLOW-UP VISIT: CPT

## 2023-02-22 NOTE — HISTORY OF PRESENT ILLNESS
[de-identified] : Ashley LEE is a 71 y/o male here for a two week  post-op visit. S/p Left scrotal hernia repair with Ventrio patch 02-Feb-2023 [de-identified] : Status post left scrotal hernia repair on 2/2/2023.  At this point has mild left groin discomfort and some posterior left testicular soreness.

## 2023-02-22 NOTE — PLAN
[FreeTextEntry1] : Patient to gradually liberalize activities as tolerated and return in 1 month for recheck.

## 2023-02-22 NOTE — HISTORY OF PRESENT ILLNESS
[de-identified] : Ashley LEE is a 71 y/o male here for a post-op visit. S/p Open repair of inguinal hernia using mesh in adult 02-Feb-2023 [de-identified] : Status post left scrotal hernia repair on 2/2/2023.  At present has no complaint of pain.  Drain output less than 25 cc/day for last several days.

## 2023-02-22 NOTE — PHYSICAL EXAM
[de-identified] : Soft, nondistended, nontender.  Left groin incision clean and healing very well with faint ridge.  Repair fully intact.  No palpable seroma or signs of infection.  Left testicle nontender.  Moderate degree of left scrotal edema.

## 2023-03-22 ENCOUNTER — APPOINTMENT (OUTPATIENT)
Dept: SURGERY | Facility: CLINIC | Age: 73
End: 2023-03-22
Payer: MEDICARE

## 2023-03-22 VITALS
DIASTOLIC BLOOD PRESSURE: 96 MMHG | RESPIRATION RATE: 18 BRPM | HEART RATE: 97 BPM | SYSTOLIC BLOOD PRESSURE: 170 MMHG | TEMPERATURE: 97.8 F | OXYGEN SATURATION: 98 %

## 2023-03-22 PROCEDURE — 99024 POSTOP FOLLOW-UP VISIT: CPT

## 2023-03-22 NOTE — PLAN
[FreeTextEntry1] : Patient to liberalize activities as tolerated and return here in approximately 4 months for recheck.

## 2023-03-22 NOTE — ASSESSMENT
[FreeTextEntry1] : Status post left scrotal hernia repair; normal postop course except for some element scrotal seroma which appears to be slowly resolving.

## 2023-03-22 NOTE — PHYSICAL EXAM
[de-identified] : Soft, nondistended, nontender.  Left groin incision clean and healing very well with resolving ridge.  Repair fully intact.  Contained area of swelling and induration within the proximal scrotum significantly smaller than at last exam but still quite firm.  No signs of infection.  Left testicle normal.

## 2023-03-22 NOTE — HISTORY OF PRESENT ILLNESS
[de-identified] : Ashley  is a 73 y/o male here for a two week post-op visit. S/p Left scrotal hernia repair with Ventrio patch 02-Feb-2023. \par  [de-identified] : Status post left scrotal hernia repair on 2/2/2023.  At present has no complaint of pain.  Swelling has decreased significantly though not completely resolved.

## 2023-06-07 NOTE — ASU PATIENT PROFILE, ADULT - FALL HARM RISK - UNIVERSAL INTERVENTIONS
Bed in lowest position, wheels locked, appropriate side rails in place/Call bell, personal items and telephone in reach/Instruct patient to call for assistance before getting out of bed or chair/Non-slip footwear when patient is out of bed/[place] to call system/Physically safe environment - no spills, clutter or unnecessary equipment/Purposeful Proactive Rounding/Room/bathroom lighting operational, light cord in reach Ear Star Wedge Flap Text: The defect edges were debeveled with a #15 blade scalpel.  Given the location of the defect and the proximity to free margins (helical rim) an ear star wedge flap was deemed most appropriate.  Using a sterile surgical marker, the appropriate flap was drawn incorporating the defect and placing the expected incisions between the helical rim and antihelix where possible.  The area thus outlined was incised through and through with a #15 scalpel blade.

## 2023-07-19 ENCOUNTER — APPOINTMENT (OUTPATIENT)
Dept: SURGERY | Facility: CLINIC | Age: 73
End: 2023-07-19
Payer: MEDICARE

## 2023-07-19 VITALS
DIASTOLIC BLOOD PRESSURE: 109 MMHG | RESPIRATION RATE: 18 BRPM | WEIGHT: 198 LBS | TEMPERATURE: 96.4 F | SYSTOLIC BLOOD PRESSURE: 166 MMHG | BODY MASS INDEX: 27.72 KG/M2 | OXYGEN SATURATION: 99 % | HEART RATE: 113 BPM | HEIGHT: 71 IN

## 2023-07-19 DIAGNOSIS — K40.90 UNILATERAL INGUINAL HERNIA, W/OUT OBSTRUCTION OR GANGRENE, NOT SPECIFIED AS RECURRENT: ICD-10-CM

## 2023-07-19 PROCEDURE — 99212 OFFICE O/P EST SF 10 MIN: CPT

## 2023-07-19 NOTE — PHYSICAL EXAM
Report received by Carol and resumed care of infant. 12 hour chart check/review also completed at this time.   [de-identified] : Soft, nondistended, nontender.  Left groin incision well-healed.  Repair fully intact.  Left scrotal edema completely resolved and left testicle normal.  Small residual seroma at the level of the external inguinal ring, approximately 2.5 cm in diameter, nontender.  No signs of infection.

## 2023-07-19 NOTE — HISTORY OF PRESENT ILLNESS
[de-identified] : Ashley is a 73 y/o male here for a 4 month follow up visit. S/p Left scrotal hernia repair with Ventrio patch 02-Feb-2023 [de-identified] : Since his last visit here patient has done quite well.  He is back to full range of normal activities and has no complaint of pain.

## 2023-07-25 NOTE — ASU PATIENT PROFILE, ADULT - ALCOHOL USE HISTORY SINGLE SELECT
yes...
Communicate Risk of Fall with Harm to all staff/Reinforce activity limits and safety measures with patient and family/Tailored Fall Risk Interventions/Visual Cue: Yellow wristband and red socks/Bed in lowest position, wheels locked, appropriate side rails in place/Call bell, personal items and telephone in reach/Instruct patient to call for assistance before getting out of bed or chair/Non-slip footwear when patient is out of bed/[place] to call system/Physically safe environment - no spills, clutter or unnecessary equipment/Purposeful Proactive Rounding/Room/bathroom lighting operational, light cord in reach

## 2024-03-25 ENCOUNTER — APPOINTMENT (OUTPATIENT)
Dept: SURGERY | Facility: CLINIC | Age: 74
End: 2024-03-25
Payer: MEDICARE

## 2024-03-25 VITALS
TEMPERATURE: 98.7 F | HEIGHT: 70 IN | HEART RATE: 96 BPM | RESPIRATION RATE: 17 BRPM | DIASTOLIC BLOOD PRESSURE: 106 MMHG | WEIGHT: 210 LBS | BODY MASS INDEX: 30.06 KG/M2 | OXYGEN SATURATION: 99 % | SYSTOLIC BLOOD PRESSURE: 186 MMHG

## 2024-03-25 DIAGNOSIS — K40.90 UNILATERAL INGUINAL HERNIA, W/OUT OBSTRUCTION OR GANGRENE, NOT SPECIFIED AS RECURRENT: ICD-10-CM

## 2024-03-25 DIAGNOSIS — G47.33 OBSTRUCTIVE SLEEP APNEA (ADULT) (PEDIATRIC): ICD-10-CM

## 2024-03-25 PROCEDURE — 99214 OFFICE O/P EST MOD 30 MIN: CPT

## 2024-03-25 NOTE — PHYSICAL EXAM
[Normal Breath Sounds] : Normal breath sounds [Normal Heart Sounds] : normal heart sounds [No Rash or Lesion] : No rash or lesion [No HSM] : no hepatosplenomegaly [Alert] : alert [Oriented to Place] : oriented to place [Oriented to Person] : oriented to person [Oriented to Time] : oriented to time [Calm] : calm [JVD] : no jugular venous distention  [Abdominal Masses] : No abdominal masses [Abdomen Tenderness] : ~T ~M No abdominal tenderness [de-identified] : no acute distress noted [de-identified] : NC/AT [de-identified] : Large Right inguinal hernia with scrotal involvment. Well healed left inguinal hernia incision [de-identified] : Difficult exam but cord and testicles bilaterally seem present

## 2024-03-25 NOTE — ASSESSMENT
[FreeTextEntry1] : Ashley is a 74yo M with a large right inguinal hernia, that is now symptomatic. There are no signs of strangulation or obstruction, but it is incarcerated. He will need right inguinal hernai repair with mesh we have also discussed possible need for orchiectomy on that right side given the size of the hernia and the placement of a drain which could be present for a number of weeks

## 2024-03-25 NOTE — HISTORY OF PRESENT ILLNESS
[de-identified] : MATA  is a 73 year  male  here for a consultation for possible right inguinal hernia.  He is a 72yo M with history of HTN, HLD s/p Left inguinal hernia repair with mesh with Dr. Chris Calloway 2/2023. Since then he has noticed that his right inguinal hernia has slowly increased in size to the point that it is now bulging out significantly. HE states his left side has healed well and has not had any issues. He denies any nausea and vomiting with his hernia. Has bene having normal bowel movements

## 2024-04-18 RX ORDER — AMLODIPINE BESYLATE 2.5 MG/1
1 TABLET ORAL
Refills: 0 | DISCHARGE
Start: 2024-04-18

## 2024-04-23 ENCOUNTER — INPATIENT (INPATIENT)
Facility: HOSPITAL | Age: 74
LOS: 2 days | Discharge: HOME | DRG: 310 | End: 2024-04-26
Attending: INTERNAL MEDICINE | Admitting: INTERNAL MEDICINE
Payer: COMMERCIAL

## 2024-04-23 VITALS
DIASTOLIC BLOOD PRESSURE: 134 MMHG | HEIGHT: 70 IN | SYSTOLIC BLOOD PRESSURE: 189 MMHG | WEIGHT: 207.9 LBS | OXYGEN SATURATION: 98 % | HEART RATE: 164 BPM | TEMPERATURE: 98 F | RESPIRATION RATE: 20 BRPM

## 2024-04-23 DIAGNOSIS — Z90.49 ACQUIRED ABSENCE OF OTHER SPECIFIED PARTS OF DIGESTIVE TRACT: Chronic | ICD-10-CM

## 2024-04-23 DIAGNOSIS — I47.19 OTHER SUPRAVENTRICULAR TACHYCARDIA: ICD-10-CM

## 2024-04-23 LAB
ADD ON TEST-SPECIMEN IN LAB: SIGNIFICANT CHANGE UP
ALBUMIN SERPL ELPH-MCNC: 5.3 G/DL — HIGH (ref 3.3–5)
ALP SERPL-CCNC: 97 U/L — SIGNIFICANT CHANGE UP (ref 40–120)
ALT FLD-CCNC: 21 U/L — SIGNIFICANT CHANGE UP (ref 10–45)
ANION GAP SERPL CALC-SCNC: 18 MMOL/L — HIGH (ref 5–17)
APPEARANCE UR: CLEAR — SIGNIFICANT CHANGE UP
AST SERPL-CCNC: 28 U/L — SIGNIFICANT CHANGE UP (ref 10–40)
BASE EXCESS BLDV CALC-SCNC: 1.4 MMOL/L — SIGNIFICANT CHANGE UP (ref -2–3)
BASOPHILS # BLD AUTO: 0.09 K/UL — SIGNIFICANT CHANGE UP (ref 0–0.2)
BASOPHILS NFR BLD AUTO: 0.7 % — SIGNIFICANT CHANGE UP (ref 0–2)
BILIRUB SERPL-MCNC: 1.1 MG/DL — SIGNIFICANT CHANGE UP (ref 0.2–1.2)
BILIRUB UR-MCNC: NEGATIVE — SIGNIFICANT CHANGE UP
BUN SERPL-MCNC: 16 MG/DL — SIGNIFICANT CHANGE UP (ref 7–23)
CA-I SERPL-SCNC: 1.23 MMOL/L — SIGNIFICANT CHANGE UP (ref 1.15–1.33)
CALCIUM SERPL-MCNC: 10.3 MG/DL — SIGNIFICANT CHANGE UP (ref 8.4–10.5)
CHLORIDE BLDV-SCNC: 102 MMOL/L — SIGNIFICANT CHANGE UP (ref 96–108)
CHLORIDE SERPL-SCNC: 101 MMOL/L — SIGNIFICANT CHANGE UP (ref 96–108)
CO2 BLDV-SCNC: 29 MMOL/L — HIGH (ref 22–26)
CO2 SERPL-SCNC: 22 MMOL/L — SIGNIFICANT CHANGE UP (ref 22–31)
COLOR SPEC: YELLOW — SIGNIFICANT CHANGE UP
CREAT SERPL-MCNC: 1.08 MG/DL — SIGNIFICANT CHANGE UP (ref 0.5–1.3)
DIFF PNL FLD: NEGATIVE — SIGNIFICANT CHANGE UP
EGFR: 72 ML/MIN/1.73M2 — SIGNIFICANT CHANGE UP
EOSINOPHIL # BLD AUTO: 0.2 K/UL — SIGNIFICANT CHANGE UP (ref 0–0.5)
EOSINOPHIL NFR BLD AUTO: 1.6 % — SIGNIFICANT CHANGE UP (ref 0–6)
GAS PNL BLDV: 136 MMOL/L — SIGNIFICANT CHANGE UP (ref 136–145)
GAS PNL BLDV: SIGNIFICANT CHANGE UP
GAS PNL BLDV: SIGNIFICANT CHANGE UP
GLUCOSE BLDV-MCNC: 139 MG/DL — HIGH (ref 70–99)
GLUCOSE SERPL-MCNC: 135 MG/DL — HIGH (ref 70–99)
GLUCOSE UR QL: NEGATIVE MG/DL — SIGNIFICANT CHANGE UP
HCO3 BLDV-SCNC: 28 MMOL/L — SIGNIFICANT CHANGE UP (ref 22–29)
HCT VFR BLD CALC: 53.2 % — HIGH (ref 39–50)
HCT VFR BLDA CALC: 54 % — HIGH (ref 39–51)
HGB BLD CALC-MCNC: 18.1 G/DL — HIGH (ref 12.6–17.4)
HGB BLD-MCNC: 18.1 G/DL — HIGH (ref 13–17)
IMM GRANULOCYTES NFR BLD AUTO: 0.5 % — SIGNIFICANT CHANGE UP (ref 0–0.9)
KETONES UR-MCNC: 15 MG/DL
LACTATE BLDV-MCNC: 2.5 MMOL/L — HIGH (ref 0.5–2)
LEUKOCYTE ESTERASE UR-ACNC: NEGATIVE — SIGNIFICANT CHANGE UP
LYMPHOCYTES # BLD AUTO: 1.77 K/UL — SIGNIFICANT CHANGE UP (ref 1–3.3)
LYMPHOCYTES # BLD AUTO: 14.2 % — SIGNIFICANT CHANGE UP (ref 13–44)
MCHC RBC-ENTMCNC: 29 PG — SIGNIFICANT CHANGE UP (ref 27–34)
MCHC RBC-ENTMCNC: 34 GM/DL — SIGNIFICANT CHANGE UP (ref 32–36)
MCV RBC AUTO: 85.1 FL — SIGNIFICANT CHANGE UP (ref 80–100)
MONOCYTES # BLD AUTO: 1.26 K/UL — HIGH (ref 0–0.9)
MONOCYTES NFR BLD AUTO: 10.1 % — SIGNIFICANT CHANGE UP (ref 2–14)
NEUTROPHILS # BLD AUTO: 9.11 K/UL — HIGH (ref 1.8–7.4)
NEUTROPHILS NFR BLD AUTO: 72.9 % — SIGNIFICANT CHANGE UP (ref 43–77)
NITRITE UR-MCNC: NEGATIVE — SIGNIFICANT CHANGE UP
NRBC # BLD: 0 /100 WBCS — SIGNIFICANT CHANGE UP (ref 0–0)
NT-PROBNP SERPL-SCNC: 46 PG/ML — SIGNIFICANT CHANGE UP (ref 0–300)
PCO2 BLDV: 48 MMHG — SIGNIFICANT CHANGE UP (ref 42–55)
PH BLDV: 7.37 — SIGNIFICANT CHANGE UP (ref 7.32–7.43)
PH UR: 6.5 — SIGNIFICANT CHANGE UP (ref 5–8)
PLATELET # BLD AUTO: 484 K/UL — HIGH (ref 150–400)
PO2 BLDV: 26 MMHG — SIGNIFICANT CHANGE UP (ref 25–45)
POTASSIUM BLDV-SCNC: 4.1 MMOL/L — SIGNIFICANT CHANGE UP (ref 3.5–5.1)
POTASSIUM SERPL-MCNC: 4.3 MMOL/L — SIGNIFICANT CHANGE UP (ref 3.5–5.3)
POTASSIUM SERPL-SCNC: 4.3 MMOL/L — SIGNIFICANT CHANGE UP (ref 3.5–5.3)
PROT SERPL-MCNC: 7.8 G/DL — SIGNIFICANT CHANGE UP (ref 6–8.3)
PROT UR-MCNC: SIGNIFICANT CHANGE UP MG/DL
RBC # BLD: 6.25 M/UL — HIGH (ref 4.2–5.8)
RBC # FLD: 13.3 % — SIGNIFICANT CHANGE UP (ref 10.3–14.5)
SAO2 % BLDV: 46.2 % — LOW (ref 67–88)
SODIUM SERPL-SCNC: 141 MMOL/L — SIGNIFICANT CHANGE UP (ref 135–145)
SP GR SPEC: 1.02 — SIGNIFICANT CHANGE UP (ref 1–1.03)
TROPONIN T, HIGH SENSITIVITY RESULT: 20 NG/L — SIGNIFICANT CHANGE UP (ref 0–51)
TROPONIN T, HIGH SENSITIVITY RESULT: 21 NG/L — SIGNIFICANT CHANGE UP (ref 0–51)
TSH SERPL-MCNC: 2.82 UIU/ML — SIGNIFICANT CHANGE UP (ref 0.27–4.2)
UROBILINOGEN FLD QL: 0.2 MG/DL — SIGNIFICANT CHANGE UP (ref 0.2–1)
WBC # BLD: 12.49 K/UL — HIGH (ref 3.8–10.5)
WBC # FLD AUTO: 12.49 K/UL — HIGH (ref 3.8–10.5)

## 2024-04-23 PROCEDURE — 93308 TTE F-UP OR LMTD: CPT | Mod: 26

## 2024-04-23 PROCEDURE — 99223 1ST HOSP IP/OBS HIGH 75: CPT

## 2024-04-23 PROCEDURE — 71045 X-RAY EXAM CHEST 1 VIEW: CPT | Mod: 26

## 2024-04-23 PROCEDURE — 99285 EMERGENCY DEPT VISIT HI MDM: CPT

## 2024-04-23 RX ORDER — ROSUVASTATIN CALCIUM 5 MG/1
1 TABLET ORAL
Refills: 0 | DISCHARGE

## 2024-04-23 RX ORDER — SODIUM CHLORIDE 9 MG/ML
1000 INJECTION, SOLUTION INTRAVENOUS ONCE
Refills: 0 | Status: DISCONTINUED | OUTPATIENT
Start: 2024-04-23 | End: 2024-04-23

## 2024-04-23 RX ORDER — HYDRALAZINE HCL 50 MG
10 TABLET ORAL ONCE
Refills: 0 | Status: COMPLETED | OUTPATIENT
Start: 2024-04-23 | End: 2024-04-23

## 2024-04-23 RX ORDER — POLYETHYLENE GLYCOL 3350 17 G/17G
0 POWDER, FOR SOLUTION ORAL
Qty: 0 | Refills: 0 | DISCHARGE

## 2024-04-23 RX ORDER — PANTOPRAZOLE SODIUM 20 MG/1
40 TABLET, DELAYED RELEASE ORAL
Refills: 0 | Status: DISCONTINUED | OUTPATIENT
Start: 2024-04-23 | End: 2024-04-26

## 2024-04-23 RX ORDER — METOPROLOL TARTRATE 50 MG
12.5 TABLET ORAL
Refills: 0 | Status: DISCONTINUED | OUTPATIENT
Start: 2024-04-23 | End: 2024-04-24

## 2024-04-23 RX ORDER — LANOLIN ALCOHOL/MO/W.PET/CERES
5 CREAM (GRAM) TOPICAL AT BEDTIME
Refills: 0 | Status: DISCONTINUED | OUTPATIENT
Start: 2024-04-23 | End: 2024-04-26

## 2024-04-23 RX ORDER — POLYETHYLENE GLYCOL 3350 17 G/17G
17 POWDER, FOR SOLUTION ORAL
Refills: 0 | DISCHARGE

## 2024-04-23 RX ORDER — SODIUM CHLORIDE 0.65 %
1 AEROSOL, SPRAY (ML) NASAL
Refills: 0 | Status: DISCONTINUED | OUTPATIENT
Start: 2024-04-23 | End: 2024-04-26

## 2024-04-23 RX ORDER — POLYETHYLENE GLYCOL 3350 17 G/17G
17 POWDER, FOR SOLUTION ORAL DAILY
Refills: 0 | Status: DISCONTINUED | OUTPATIENT
Start: 2024-04-23 | End: 2024-04-26

## 2024-04-23 RX ORDER — SODIUM CHLORIDE 9 MG/ML
1000 INJECTION, SOLUTION INTRAVENOUS
Refills: 0 | Status: DISCONTINUED | OUTPATIENT
Start: 2024-04-23 | End: 2024-04-26

## 2024-04-23 RX ORDER — LOSARTAN POTASSIUM 100 MG/1
100 TABLET, FILM COATED ORAL DAILY
Refills: 0 | Status: DISCONTINUED | OUTPATIENT
Start: 2024-04-24 | End: 2024-04-26

## 2024-04-23 RX ORDER — ASPIRIN/CALCIUM CARB/MAGNESIUM 324 MG
162 TABLET ORAL ONCE
Refills: 0 | Status: COMPLETED | OUTPATIENT
Start: 2024-04-23 | End: 2024-04-23

## 2024-04-23 RX ORDER — ATORVASTATIN CALCIUM 80 MG/1
40 TABLET, FILM COATED ORAL AT BEDTIME
Refills: 0 | Status: DISCONTINUED | OUTPATIENT
Start: 2024-04-23 | End: 2024-04-26

## 2024-04-23 RX ORDER — LOSARTAN POTASSIUM 100 MG/1
100 TABLET, FILM COATED ORAL ONCE
Refills: 0 | Status: COMPLETED | OUTPATIENT
Start: 2024-04-23 | End: 2024-04-23

## 2024-04-23 RX ORDER — ACETAMINOPHEN 500 MG
650 TABLET ORAL ONCE
Refills: 0 | Status: COMPLETED | OUTPATIENT
Start: 2024-04-23 | End: 2024-04-23

## 2024-04-23 RX ORDER — LOSARTAN POTASSIUM 100 MG/1
1 TABLET, FILM COATED ORAL
Qty: 0 | Refills: 0 | DISCHARGE

## 2024-04-23 RX ORDER — LANSOPRAZOLE 15 MG/1
1 CAPSULE, DELAYED RELEASE ORAL
Qty: 0 | Refills: 0 | DISCHARGE

## 2024-04-23 RX ORDER — SODIUM CHLORIDE 9 MG/ML
500 INJECTION, SOLUTION INTRAVENOUS ONCE
Refills: 0 | Status: DISCONTINUED | OUTPATIENT
Start: 2024-04-23 | End: 2024-04-23

## 2024-04-23 RX ORDER — ROSUVASTATIN CALCIUM 5 MG/1
1 TABLET ORAL
Qty: 0 | Refills: 0 | DISCHARGE

## 2024-04-23 RX ADMIN — Medication 10 MILLIGRAM(S): at 23:49

## 2024-04-23 RX ADMIN — Medication 12.5 MILLIGRAM(S): at 16:20

## 2024-04-23 RX ADMIN — Medication 10 MILLIGRAM(S): at 18:24

## 2024-04-23 RX ADMIN — Medication 162 MILLIGRAM(S): at 13:45

## 2024-04-23 RX ADMIN — Medication 650 MILLIGRAM(S): at 23:21

## 2024-04-23 RX ADMIN — SODIUM CHLORIDE 1000 MILLILITER(S): 9 INJECTION, SOLUTION INTRAVENOUS at 12:07

## 2024-04-23 RX ADMIN — ATORVASTATIN CALCIUM 40 MILLIGRAM(S): 80 TABLET, FILM COATED ORAL at 22:35

## 2024-04-23 RX ADMIN — Medication 5 MILLIGRAM(S): at 22:34

## 2024-04-23 RX ADMIN — Medication 1 MILLIGRAM(S): at 22:34

## 2024-04-23 NOTE — CONSULT NOTE ADULT - ASSESSMENT
A/p  73-year-old male with past medical history of BPH, hyperlipidemia, hypertension, JUAN FRANCISCO, known right bundle branch block, prediabetes, left inguinal hernia repair presents with palpitations since this morning.       #Palpitations  -Initially c/f afib, however ecg appears to be regular with p-waves - likely atrial tachycardia  -Has known hx of RBBB  -Remains tachy on telemetry  -Consider starting beta blocker   -Pending EP evaluation  -Check TSH  -Check echo when rate improves   -R/o infectious causes    #HTN  -Per pt taking losartan 100mg and recently started amlodipine 2.5mg  -BP elevated  -Resume losartan 100mg qd     #HLD  -Resume statin      dvt ppx

## 2024-04-23 NOTE — H&P ADULT - ASSESSMENT
73-year-old male with past medical history of BPH, anxiety, GERD, hyperlipidemia, hypertension, JUAN FRANCISCO, known right bundle branch block, prediabetes, left inguinal hernia repair presents with palpitations since this morning.    Patient states that he has had palpitations and some chest pressure that started up 10 AM on 4/23( today).  He has been documenting his blood pressures which she states have been high for the past couple weeks. ptn also notes he has been eating a lot chinese and Mexican food in the past couple weeks.  On 4/18 he started taking Norvasc 2.5 mg in addition to the LOSARTAN 100 mg he has been taking for many years.   His last stress and echo were 15 years ago. he knows he has an enlarged prostate w a psa in the 4s, but hasnt had an US , nor has seen a Urologist. Ptn also has never had a screening colonoscopy. 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  No prior history of arrhythmias. Denies dysuria, cough, rhinorrhea, fevers.     Hypertensive urgency  Tachycardia, prob atrial reactive  ACS    - start Metoprolol 12.5 mg bid, adjust dosage as needed  - cont Losartan, dc Norvasc  - check TTE, if neg and once BP is controlled get a Nucl Exercise stress test  - check TFTs, PSA, HA1C  - ptn appears hypovolemic on exam, hydrate w 1/2 NS  - has elevated H/H, prob dilutional, check iron studies  - cont PPI  - DVT ppx w sc Lovenox

## 2024-04-23 NOTE — PATIENT PROFILE ADULT - FALL HARM RISK - UNIVERSAL INTERVENTIONS
Bed in lowest position, wheels locked, appropriate side rails in place/Call bell, personal items and telephone in reach/Instruct patient to call for assistance before getting out of bed or chair/Non-slip footwear when patient is out of bed/[place] to call system/Physically safe environment - no spills, clutter or unnecessary equipment/Purposeful Proactive Rounding/Room/bathroom lighting operational, light cord in reach

## 2024-04-23 NOTE — ED PROVIDER NOTE - CLINICAL SUMMARY MEDICAL DECISION MAKING FREE TEXT BOX
73-year-old male with past medical history of BPH, hyperlipidemia, hypertension, JUAN FRANCISCO, known right bundle branch block, prediabetes, left inguinal hernia repair presents with palpitations since this morning.  Patient states that he has had palpitations and some chest pressure that started up 10 AM this morning.  He has been documenting his blood pressures which she states have been high.  He started amlodipine 2.5 mg 5 days ago in addition to his Cozaar for hypertension.  Denies shortness of breath, lightheadedness, leg swelling, long stationary periods.  No prior history of A-fib. Denies dysuria, cough, rhinorrhea, fevers.     GENERAL: no acute distress, non-toxic appearing  HEAD: normocephalic, atraumatic  HEENT: normal conjunctiva, oral mucosa moist, neck supple  CARDIAC: regular rate and rhythm, normal S1 and S2,  no appreciable murmurs  PULM: clear to ascultation bilaterally, no crackles, rales, rhonchi, or wheezing  GI: abdomen nondistended, soft, nontender, no guarding or rebound tenderness  NEURO: alert and oriented x 3, normal speech, moving all extremities   MSK: no visible deformities, no peripheral edema, calf tenderness/redness/swelling  SKIN: no visible rashes, dry, well-perfused  PSYCH: appropriate mood and affect    74 yo M PMHx of HTN, HLD, preDM, JUAN FRANCISCO presents with new Afib with RVR, hypertensive to 200s with mild chest pressure. Will discern etiology of Afib to rule out infectious, metabolic abnormalities, give fluids. Low suspicion for PE as patient is not short of breath, tachypneic, hypoxic with no risk factors. Will work up ACS with troponins. Will rate control, anticoagulate if no immediate cause is found.

## 2024-04-23 NOTE — ED ADULT NURSE NOTE - NSFALLHARMRISKINTERV_ED_ALL_ED
Assistance OOB with selected safe patient handling equipment if applicable/Communicate risk of Fall with Harm to all staff, patient, and family/Monitor gait and stability/Provide patient with walking aids/Provide visual cue: red socks, yellow wristband, yellow gown, etc/Reinforce activity limits and safety measures with patient and family/Bed in lowest position, wheels locked, appropriate side rails in place/Call bell, personal items and telephone in reach/Instruct patient to call for assistance before getting out of bed/chair/stretcher/Non-slip footwear applied when patient is off stretcher/[place] to call system/Physically safe environment - no spills, clutter or unnecessary equipment/Purposeful Proactive Rounding/Room/bathroom lighting operational, light cord in reach

## 2024-04-23 NOTE — ED PROVIDER NOTE - ATTENDING CONTRIBUTION TO CARE
Attending MD Riley:  I have seen and examined this patient and fully participated in the care of this patient as the teaching attending. I personally made/approved the management plan and take responsibility for the patient management.      73-year-old gentleman with history of hypertension hyperlipidemia mild JUAN FRANCISCO is presenting for evaluation of palpitations chest pressure and elevated blood pressure.  He states the palpitations started around 10 AM.  His blood pressure has been elevated for the last week he is on losartan but amlodipine was recently started as well.  No known history of atrial fibrillation.    Patient's vital signs in triage notable for heart rate 155 blood pressure 203 systolic.  Patient is sitting in the stretcher in no apparent distress.  He is slightly anxious at times.  Breathing comfortably on room air.  The extremities are warm and well-perfused.  No significant peripheral edema.    ECG recorded at 1130 independently interpreted by me , Dr Grant Riley,  at 1210 shows rapid atrial fibrillation rate 137 right bundle branch block no acute ischemic ST-T changes    ED POCUS with limited echo windows however limited views of the LV with apparent hyperdynamic LV, remainder of echo windows are very limited    Patient is presenting for acute onset of palpitations chest pressure shortness of breath, found to be in rapid atrial fibrillation here.  Patient is also hypertensive to the 200s systolic.  Plan to obtain screening labs to exclude obvious secondary acute medical process, no acute medical process is apparent from bedside exam and history.  Will defer rate control measures until screening lab work has returned.  Patient is hypertensive at this time but no overt evidence of malignant hypertension at this time.  Blood pressure should improve with rate control measures.  Patient's [id number] 2 score is 2, based on this we will likely recommend patient initiate apixaban for CVA prophylaxis.  Will determine this after initial blood work returns.      *The above represents an initial assessment/impression. Please refer to progress notes for potential changes in patient clinical course*

## 2024-04-23 NOTE — CONSULT NOTE ADULT - ASSESSMENT
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  Patient states that he had [facility] his 20 lb. [facility] when he began feeling unwell, and began having palpitations.  He went home and took his BP and pulse and noted BP to be 230/130 and HR 160bpm.  His brother drove him to the ER.  On tele patient has been having paroxysmal AT, Sinus Tach with PAC's.  Of note patient admits to using Afrin nasal spray 1-2x daily.  Patient does not have a Cardiologist and he last had a Stress Test about 10 years ago which he stated was normal.        1.  Paroxysmal ATach  2.  Uncontrolled HTN  3.  History of RBBB  4.  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  Patient states that he had [facility] his 20 lb. [facility] when he began feeling unwell, and began having palpitations.  He went home and took his BP and pulse and noted BP to be 230/130 and HR 160bpm.  His brother drove him to the ER.  On tele patient has been having paroxysmal AT, Sinus Tach with PAC's.  Of note patient admits to using Afrin nasal spray 1-2x daily.  Patient does not have a Cardiologist and he last had a Stress Test about 10 years ago which he stated was normal.        1.  Paroxysmal ATach  2.  Uncontrolled HTN  3.  History of RBBB  4.  JUAN FRANCISCO not on CPAP     -Recommend starting beta blocker for AT rate control, in ER started on Lopressor 12.5mg BID uptitrate for HR control   -Check TSH  -Check echo to evaluate EF/LV   -For hypertension patient had been taking losartan 100mg and recently started amlodipine 2.5mg last week  -continue to monitor closely on telemetry  -Maintain K+ >4.0, Mg+>2.0  - Upon discharge would recommend 2 week Zio XT external monitor to assess AT burden - order placed in [facility]     GLORIA Azul [facility]  [phone]

## 2024-04-23 NOTE — ED PROVIDER NOTE - PROGRESS NOTE DETAILS
Attending MD Riley: Review of patient's telemetry strip reveals frequent change from sinus tachycardia to bouts of narrow complex tachycardia, this does not seem to be behaving the way I would expect with atrial fibrillation.  The bouts of tachycardia on the twelve-lead appeared slightly irregular, they appear more regular on the telemetry strips raising concern for possible atrial tachycardia as opposed to atrial fibrillation.  For the time being I think it is reasonable to defer anticoagulation and patient should have EP consultation and comprehensive echocardiogram Attending MD Riley: Blood pressure is improved to 168/90 without targeted intervention.  Will admit to hospital for telemetry monitoring EP consultation TTE

## 2024-04-23 NOTE — ED ADULT NURSE NOTE - OBJECTIVE STATEMENT
74yo M h/o HTN, preDM. aaox4 presents to ED from home, as per pt 1  hour PTA started with a palpitation and chest discomfort, also pt report frequency urinations , took his bp which it was elevated, Pt denies, SOB, HA, vision changes, n/v/d, fevers chills, abdominal pain, weakness . Safety and comfort measures initiated- bed placed in lowest position and side rails raised. Pt oriented to call bell system.

## 2024-04-23 NOTE — H&P ADULT - HISTORY OF PRESENT ILLNESS
73-year-old male with past medical history of BPH, anxiety, GERD, hyperlipidemia, hypertension, JUAN FRANCISCO, known right bundle branch block, prediabetes, left inguinal hernia repair presents with palpitations since this morning.    Patient states that he has had palpitations and some chest pressure that started up 10 AM on 4/23( today).  He has been documenting his blood pressures which she states have been high for the past couple weeks. ptn also notes he has been eating a lot chinese and Mexican food in the past couple weeks.  On 4/18 he started taking Norvasc 2.5 mg in addition to the LOSARTAN 100 mg he has been taking for many years.   His last stress and echo were 15 years ago. he knows he has an enlarged prostate w a psa in the 4s, but hasnt had an US , nor has seen a Urologist. Ptn also has never had a screening colonoscopy. 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  No prior history of arrhythmias. Denies dysuria, cough, rhinorrhea, fevers.

## 2024-04-23 NOTE — H&P ADULT - NSHPPHYSICALEXAM_GEN_ALL_CORE
T(C): 36.5 (04-23-24 @ 11:56), Max: 36.7 (04-23-24 @ 11:31)  HR: 101 (04-23-24 @ 13:06) (101 - 164)  BP: 168/90 (04-23-24 @ 13:06) (168/90 - 203/117)  RR: 18 (04-23-24 @ 13:06) (18 - 20)  SpO2: 97% (04-23-24 @ 13:06) (97% - 98%)    PHYSICAL EXAM:  GENERAL: NAD, well-developed  HEAD:  Atraumatic, Normocephalic  EYES: EOMI, PERRLA, conjunctiva and sclera clear  NECK: Supple, No JVD  CHEST/LUNG: Clear to auscultation bilaterally; No wheeze  HEART: Regular rate and rhythm; No murmurs, rubs, or gallops  ABDOMEN: Soft, Nontender, Nondistended; Bowel sounds present  EXTREMITIES:  2+ Peripheral Pulses, No clubbing, cyanosis, or edema  PSYCH: AAOx3  NEUROLOGY: non-focal  SKIN: No rashes or lesions

## 2024-04-23 NOTE — CONSULT NOTE ADULT - SUBJECTIVE AND OBJECTIVE BOX
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  Patient states that he had [facility] lb. [facility] when he began feeling unwell, and began having palpitations.  He went home and took his BP and pulse and noted BP to be 230/130 and HR 160bpm.  His brother drove him to the ER.  On tele patient has been having paroxysmal AT, Sinus Tach with PAC's.  Of note patient admits to using Afrin nasal spray 1-2x daily.  Patient does not have a Cardiologist and he last had a Stress Test about 10 years ago which he stated was  normal.  Currently denies CP, palpitations, dizziness, lightheadedness or SOB.         Allergies    penicillin (Unknown)    Intolerances    MEDICATIONS:  metoprolol tartrate 12.5 milliGRAM(s) Oral two times a day    LORazepam     Tablet 1 milliGRAM(s) Oral daily    pantoprazole    Tablet 40 milliGRAM(s) Oral before breakfast  polyethylene glycol 3350 17 Gram(s) Oral daily    atorvastatin 40 milliGRAM(s) Oral at bedtime    sodium chloride 0.45%. 1000 milliLiter(s) IV Continuous <Continuous>      PAST MEDICAL & SURGICAL HISTORY:  Hypertension    Hyperlipidemia    Left inguinal hernia    JUAN FRANCISCO (obstructive sleep apnea)    BPH (benign prostatic hyperplasia)    Right bundle branch block (RBBB) with left anterior hemiblock    Borderline diabetes mellitus      History of cholecystectomy      FAMILY HISTORY:  Father with CHB and PPM placed at age 64    SOCIAL HISTORY:    [ x] Non-smoker  [ ] Smoker  [x ] Alcohol, admits to occasional alcohol. Last drink 3 weeks ago.   2 cups of Coffee daily      REVIEW OF SYSTEMS:  See HPI. Otherwise, 12 point ROS done and otherwise negative.    PHYSICAL EXAM:  T(C): 36.4 (04-23-24 @ 16:49), Max: 36.7 (04-23-24 @ 11:31)  HR: 83 (04-23-24 @ 16:49) (83 - 164)  BP: 177/95 (04-23-24 @ 16:49) (159/94 - 203/117)  RR: 18 (04-23-24 @ 16:49) (18 - 20)  SpO2: 93% (04-23-24 @ 16:49) (93% - 98%)      Appearance: Normal	  HEENT:   Normal oral mucosa, PERRL, EOMI	  Lymphatic: No lymphadenopathy  Cardiovascular: Normal S1 S2, regular. No JVD, No murmurs, No edema  Respiratory: Lungs clear to auscultation	  Psychiatry: A & O x 3, Mood & affect appropriate  Gastrointestinal:  Soft, Non-tender, + BS	  Skin: No rashes, No ecchymoses, No cyanosis	  Extremities: Normal range of motion, No clubbing, cyanosis or edema  Vascular: Peripheral pulses palpable 2+ bilaterally      LABS:	 	    CBC Full  -  ( 23 Apr 2024 11:59 )  WBC Count : 12.49 K/uL  Hemoglobin : 18.1 g/dL  Hematocrit : 53.2 %  Platelet Count - Automated : 484 K/uL  Mean Cell Volume : 85.1 fl  Mean Cell Hemoglobin : 29.0 pg  Mean Cell Hemoglobin Concentration : 34.0 gm/dL  Auto Neutrophil # : 9.11 K/uL  Auto Lymphocyte # : 1.77 K/uL  Auto Monocyte # : 1.26 K/uL  Auto Eosinophil # : 0.20 K/uL  Auto Basophil # : 0.09 K/uL  Auto Neutrophil % : 72.9 %  Auto Lymphocyte % : 14.2 %  Auto Monocyte % : 10.1 %  Auto Eosinophil % : 1.6 %  Auto Basophil % : 0.7 %    04-23    141  |  101  |  16  ----------------------------<  135<H>  4.3   |  22  |  1.08    Ca    10.3      23 Apr 2024 11:59  Phos  3.8     04-23  Mg     2.2     04-23    TPro  7.8  /  Alb  5.3<H>  /  TBili  1.1  /  DBili  x   /  AST  28  /  ALT  21  /  AlkPhos  97  04-23    TSH: Thyroid Stimulating Hormone, Serum: 2.82 uIU/mL (04-23 @ 11:59)        TELEMETRY: 	    		    
CARDIOLOGY CONSULT - Dr. Huff         HPI: 73-year-old male with past medical history of BPH, hyperlipidemia, hypertension, JUAN FRANCISCO, known right bundle branch block, prediabetes, left inguinal hernia repair presents with palpitations since this morning.  Patient states that he has had palpitations and some chest pressure that started up 10 AM this morning.  He has been documenting his blood pressures which she states have been high.  He started amlodipine 2.5 mg 5 days ago in addition to his Cozaar for hypertension.  Denies shortness of breath, lightheadedness, leg swelling, long stationary periods.  No prior history of A-fib. Denies dysuria, cough, rhinorrhea, fevers.         PAST MEDICAL & SURGICAL HISTORY:  Hypertension      Hyperlipidemia      Left inguinal hernia      JUAN FRANCISCO (obstructive sleep apnea)      BPH (benign prostatic hyperplasia)      Right bundle branch block (RBBB) with left anterior hemiblock      Borderline diabetes mellitus      History of cholecystectomy      PREVIOUS DIAGNOSTIC TESTING:    [ ] Echocardiogram:  [ ]  Catheterization:  [ ] Stress Test:  	    MEDICATIONS:  Home Medications:  amLODIPine 2.5 mg oral tablet: 1 tab(s) orally once a day (23 Apr 2024 14:54)  LORazepam 1 mg oral tablet: 1 tab(s) orally once a day as needed (23 Apr 2024 14:49)  losartan 100 mg oral tablet: 1 tab(s) orally once a day (23 Apr 2024 14:49)  MiraLax oral powder for reconstitution: 17 gram(s) orally once a day (23 Apr 2024 14:45)  Prevacid 15 mg oral delayed release capsule: 1 cap(s) orally once a day (23 Apr 2024 14:49)  rosuvastatin 10 mg oral tablet: 1 tab(s) orally once a day (23 Apr 2024 14:42)      MEDICATIONS  (STANDING):      FAMILY HISTORY:      SOCIAL HISTORY:    [x] Non-smoker  [ ] Smoker  [ ] Alcohol    Allergies    penicillin (Unknown)    Intolerances    	    REVIEW OF SYSTEMS:  CONSTITUTIONAL: No fever, weight loss, or fatigue  EYES: No eye pain, visual disturbances, or discharge  ENMT:  No difficulty hearing, tinnitus, vertigo; No sinus or throat pain  NECK: No pain or stiffness  RESPIRATORY: No cough, wheezing, chills or hemoptysis; No Shortness of Breath  CARDIOVASCULAR: No chest pain, +Palpitations, no passing out, dizziness, or leg swelling  GASTROINTESTINAL: No abdominal or epigastric pain. No nausea, vomiting, or hematemesis; No diarrhea or constipation. No melena or hematochezia.  GENITOURINARY: No dysuria, frequency, hematuria, or incontinence  NEUROLOGICAL: No headaches, memory loss, loss of strength, numbness, or tremors  SKIN: No itching, burning, rashes, or lesions   	    [x] All others negative	  [ ] Unable to obtain    PHYSICAL EXAM:  T(C): 36.5 (04-23-24 @ 11:56), Max: 36.7 (04-23-24 @ 11:31)  HR: 101 (04-23-24 @ 13:06) (101 - 164)  BP: 168/90 (04-23-24 @ 13:06) (168/90 - 203/117)  RR: 18 (04-23-24 @ 13:06) (18 - 20)  SpO2: 97% (04-23-24 @ 13:06) (97% - 98%)  Wt(kg): --  I&O's Summary      Appearance: Normal	  Psychiatry: A & O x 3, Mood & affect appropriate  HEENT:   Normal oral mucosa, PERRL, EOMI	  Lymphatic: No lymphadenopathy  Cardiovascular: Normal S1 S2,RRR, No JVD, No murmurs  Respiratory: Lungs clear to auscultation b/l	  Gastrointestinal:  Soft, Non-tender, + BS	  Skin: No rashes, No ecchymoses, No cyanosis	  Neurologic: Non-focal  Extremities: Normal range of motion, No clubbing, cyanosis or edema  Vascular: Peripheral pulses palpable 2+ bilaterally    TELEMETRY: Atach up to 120s 	    ECG:  Atrial tachycardia 137bpm	  RADIOLOGY:  < from: Xray Chest 1 View- PORTABLE-Urgent (04.23.24 @ 13:00) >    FINDINGS:    The heart is normal in size.  The lungs are clear. No pleural effusion.  There is no pneumothorax.  No acute bony abnormality.    IMPRESSION:  Clear lungs.    --- End of Report ---    < end of copied text >    OTHER: 	  	  LABS:	 	    CARDIAC MARKERS:  Troponin T, High Sensitivity Result: 20 ng/L (04-23 @ 11:59)                                  18.1   12.49 )-----------( 484      ( 23 Apr 2024 11:59 )             53.2     04-23    141  |  101  |  16  ----------------------------<  135<H>  4.3   |  22  |  1.08    Ca    10.3      23 Apr 2024 11:59  Phos  3.8     04-23  Mg     2.2     04-23    TPro  7.8  /  Alb  5.3<H>  /  TBili  1.1  /  DBili  x   /  AST  28  /  ALT  21  /  AlkPhos  97  04-23      proBNP:   Lipid Profile:   HgA1c:   TSH: Thyroid Stimulating Hormone, Serum: 2.82 uIU/mL (04-23 @ 11:59)

## 2024-04-23 NOTE — CONSULT NOTE ADULT - NS ATTEND AMEND GEN_ALL_CORE FT
Paroxysmal atrial tachycardia in setting of uncontrolled HTN. SBPs up to 220s mmHg. Begin beta blocker. 2 week event monitor in outpt setting.

## 2024-04-23 NOTE — CONSULT NOTE ADULT - TIME BILLING
agree with above  73-year-old male with past medical history of BPH, hyperlipidemia, hypertension, JUAN FRANCISCO, known right bundle branch block, prediabetes, left inguinal hernia repair presents with palpitations since this morning.       #Palpitations  -Initially c/f afib, however ecg appears to be regular with p-waves - likely atrial tachycardia  -Has known hx of RBBB  -Remains tachy on telemetry  -Consider starting beta blocker   -Pending EP evaluation  -Check TSH  -Check echo when rate improves   -R/o infectious causes    #HTN  -Per pt taking losartan 100mg and recently started amlodipine 2.5mg  -BP elevated  -Resume losartan 100mg qd     #HLD  -Resume statin      dvt ppx

## 2024-04-23 NOTE — ED PROCEDURE NOTE - PROCEDURE ADDITIONAL DETAILS
POCUS: Emergency Department Focused Ultrasound performed at patient's bedside.  The complete report will be available in PACS.    Limited views overall however likely preserved global systolic left ventricular function.  Right ventricle is not well-visualized. IVC not well visualized.

## 2024-04-24 ENCOUNTER — RESULT REVIEW (OUTPATIENT)
Age: 74
End: 2024-04-24

## 2024-04-24 DIAGNOSIS — I47.19 OTHER SUPRAVENTRICULAR TACHYCARDIA: ICD-10-CM

## 2024-04-24 LAB
A1C WITH ESTIMATED AVERAGE GLUCOSE RESULT: 6.3 % — HIGH (ref 4–5.6)
ANION GAP SERPL CALC-SCNC: 16 MMOL/L — SIGNIFICANT CHANGE UP (ref 5–17)
BUN SERPL-MCNC: 14 MG/DL — SIGNIFICANT CHANGE UP (ref 7–23)
CALCIUM SERPL-MCNC: 9.8 MG/DL — SIGNIFICANT CHANGE UP (ref 8.4–10.5)
CHLORIDE SERPL-SCNC: 105 MMOL/L — SIGNIFICANT CHANGE UP (ref 96–108)
CHOLEST SERPL-MCNC: 156 MG/DL — SIGNIFICANT CHANGE UP
CO2 SERPL-SCNC: 21 MMOL/L — LOW (ref 22–31)
CREAT SERPL-MCNC: 0.76 MG/DL — SIGNIFICANT CHANGE UP (ref 0.5–1.3)
EGFR: 95 ML/MIN/1.73M2 — SIGNIFICANT CHANGE UP
ESTIMATED AVERAGE GLUCOSE: 134 MG/DL — HIGH (ref 68–114)
GLUCOSE SERPL-MCNC: 92 MG/DL — SIGNIFICANT CHANGE UP (ref 70–99)
HCT VFR BLD CALC: 50.8 % — HIGH (ref 39–50)
HDLC SERPL-MCNC: 46 MG/DL — SIGNIFICANT CHANGE UP
HGB BLD-MCNC: 16.7 G/DL — SIGNIFICANT CHANGE UP (ref 13–17)
LIPID PNL WITH DIRECT LDL SERPL: 91 MG/DL — SIGNIFICANT CHANGE UP
MCHC RBC-ENTMCNC: 28.3 PG — SIGNIFICANT CHANGE UP (ref 27–34)
MCHC RBC-ENTMCNC: 32.9 GM/DL — SIGNIFICANT CHANGE UP (ref 32–36)
MCV RBC AUTO: 86 FL — SIGNIFICANT CHANGE UP (ref 80–100)
NON HDL CHOLESTEROL: 109 MG/DL — SIGNIFICANT CHANGE UP
NRBC # BLD: 0 /100 WBCS — SIGNIFICANT CHANGE UP (ref 0–0)
PLATELET # BLD AUTO: 435 K/UL — HIGH (ref 150–400)
POTASSIUM SERPL-MCNC: 3.9 MMOL/L — SIGNIFICANT CHANGE UP (ref 3.5–5.3)
POTASSIUM SERPL-SCNC: 3.9 MMOL/L — SIGNIFICANT CHANGE UP (ref 3.5–5.3)
RBC # BLD: 5.91 M/UL — HIGH (ref 4.2–5.8)
RBC # FLD: 13.7 % — SIGNIFICANT CHANGE UP (ref 10.3–14.5)
SODIUM SERPL-SCNC: 142 MMOL/L — SIGNIFICANT CHANGE UP (ref 135–145)
T3 SERPL-MCNC: 122 NG/DL — SIGNIFICANT CHANGE UP (ref 80–200)
T4 AB SER-ACNC: 7.3 UG/DL — SIGNIFICANT CHANGE UP (ref 4.6–12)
TRIGL SERPL-MCNC: 95 MG/DL — SIGNIFICANT CHANGE UP
TSH SERPL-MCNC: 1.49 UIU/ML — SIGNIFICANT CHANGE UP (ref 0.27–4.2)
WBC # BLD: 10.63 K/UL — HIGH (ref 3.8–10.5)
WBC # FLD AUTO: 10.63 K/UL — HIGH (ref 3.8–10.5)

## 2024-04-24 PROCEDURE — 93306 TTE W/DOPPLER COMPLETE: CPT | Mod: 26

## 2024-04-24 RX ORDER — ACETAMINOPHEN 500 MG
650 TABLET ORAL EVERY 6 HOURS
Refills: 0 | Status: DISCONTINUED | OUTPATIENT
Start: 2024-04-24 | End: 2024-04-26

## 2024-04-24 RX ORDER — METOPROLOL TARTRATE 50 MG
25 TABLET ORAL DAILY
Refills: 0 | Status: DISCONTINUED | OUTPATIENT
Start: 2024-04-25 | End: 2024-04-25

## 2024-04-24 RX ADMIN — SODIUM CHLORIDE 75 MILLILITER(S): 9 INJECTION, SOLUTION INTRAVENOUS at 16:06

## 2024-04-24 RX ADMIN — LOSARTAN POTASSIUM 100 MILLIGRAM(S): 100 TABLET, FILM COATED ORAL at 05:31

## 2024-04-24 RX ADMIN — Medication 5 MILLIGRAM(S): at 21:08

## 2024-04-24 RX ADMIN — Medication 650 MILLIGRAM(S): at 09:47

## 2024-04-24 RX ADMIN — ATORVASTATIN CALCIUM 40 MILLIGRAM(S): 80 TABLET, FILM COATED ORAL at 21:06

## 2024-04-24 RX ADMIN — Medication 650 MILLIGRAM(S): at 10:42

## 2024-04-24 RX ADMIN — Medication 1 MILLIGRAM(S): at 21:06

## 2024-04-24 RX ADMIN — Medication 650 MILLIGRAM(S): at 00:21

## 2024-04-24 RX ADMIN — PANTOPRAZOLE SODIUM 40 MILLIGRAM(S): 20 TABLET, DELAYED RELEASE ORAL at 05:31

## 2024-04-24 RX ADMIN — POLYETHYLENE GLYCOL 3350 17 GRAM(S): 17 POWDER, FOR SOLUTION ORAL at 12:08

## 2024-04-24 RX ADMIN — Medication 12.5 MILLIGRAM(S): at 05:31

## 2024-04-24 RX ADMIN — Medication 12.5 MILLIGRAM(S): at 17:45

## 2024-04-24 RX ADMIN — Medication 650 MILLIGRAM(S): at 17:47

## 2024-04-25 LAB
HCT VFR BLD CALC: 50.2 % — HIGH (ref 39–50)
HGB BLD-MCNC: 16.5 G/DL — SIGNIFICANT CHANGE UP (ref 13–17)
MCHC RBC-ENTMCNC: 28.4 PG — SIGNIFICANT CHANGE UP (ref 27–34)
MCHC RBC-ENTMCNC: 32.9 GM/DL — SIGNIFICANT CHANGE UP (ref 32–36)
MCV RBC AUTO: 86.4 FL — SIGNIFICANT CHANGE UP (ref 80–100)
NRBC # BLD: 0 /100 WBCS — SIGNIFICANT CHANGE UP (ref 0–0)
PLATELET # BLD AUTO: 393 K/UL — SIGNIFICANT CHANGE UP (ref 150–400)
RBC # BLD: 5.81 M/UL — HIGH (ref 4.2–5.8)
RBC # FLD: 13.6 % — SIGNIFICANT CHANGE UP (ref 10.3–14.5)
WBC # BLD: 8.98 K/UL — SIGNIFICANT CHANGE UP (ref 3.8–10.5)
WBC # FLD AUTO: 8.98 K/UL — SIGNIFICANT CHANGE UP (ref 3.8–10.5)

## 2024-04-25 RX ORDER — METOPROLOL TARTRATE 50 MG
25 TABLET ORAL ONCE
Refills: 0 | Status: COMPLETED | OUTPATIENT
Start: 2024-04-25 | End: 2024-04-25

## 2024-04-25 RX ORDER — METOPROLOL TARTRATE 50 MG
50 TABLET ORAL DAILY
Refills: 0 | Status: DISCONTINUED | OUTPATIENT
Start: 2024-04-26 | End: 2024-04-26

## 2024-04-25 RX ORDER — AMLODIPINE BESYLATE 2.5 MG/1
5 TABLET ORAL DAILY
Refills: 0 | Status: DISCONTINUED | OUTPATIENT
Start: 2024-04-25 | End: 2024-04-26

## 2024-04-25 RX ADMIN — ATORVASTATIN CALCIUM 40 MILLIGRAM(S): 80 TABLET, FILM COATED ORAL at 21:47

## 2024-04-25 RX ADMIN — Medication 1 MILLIGRAM(S): at 21:46

## 2024-04-25 RX ADMIN — LOSARTAN POTASSIUM 100 MILLIGRAM(S): 100 TABLET, FILM COATED ORAL at 05:04

## 2024-04-25 RX ADMIN — Medication 650 MILLIGRAM(S): at 12:45

## 2024-04-25 RX ADMIN — Medication 5 MILLIGRAM(S): at 21:46

## 2024-04-25 RX ADMIN — AMLODIPINE BESYLATE 5 MILLIGRAM(S): 2.5 TABLET ORAL at 10:44

## 2024-04-25 RX ADMIN — Medication 650 MILLIGRAM(S): at 13:38

## 2024-04-25 RX ADMIN — Medication 25 MILLIGRAM(S): at 05:05

## 2024-04-25 RX ADMIN — PANTOPRAZOLE SODIUM 40 MILLIGRAM(S): 20 TABLET, DELAYED RELEASE ORAL at 05:05

## 2024-04-25 RX ADMIN — Medication 25 MILLIGRAM(S): at 17:37

## 2024-04-25 NOTE — PROVIDER CONTACT NOTE (OTHER) - ASSESSMENT
Pt is A&Ox4, NSR on tele. RA. Independent. Denies chest pain, sob, palpitations, or other discomforts. Pt educated on indication for fluids (possible hypovolemia).

## 2024-04-26 ENCOUNTER — TRANSCRIPTION ENCOUNTER (OUTPATIENT)
Age: 74
End: 2024-04-26

## 2024-04-26 ENCOUNTER — RESULT REVIEW (OUTPATIENT)
Age: 74
End: 2024-04-26

## 2024-04-26 VITALS — DIASTOLIC BLOOD PRESSURE: 84 MMHG | SYSTOLIC BLOOD PRESSURE: 150 MMHG | HEART RATE: 69 BPM

## 2024-04-26 PROCEDURE — 71045 X-RAY EXAM CHEST 1 VIEW: CPT

## 2024-04-26 PROCEDURE — 85018 HEMOGLOBIN: CPT

## 2024-04-26 PROCEDURE — 93005 ELECTROCARDIOGRAM TRACING: CPT

## 2024-04-26 PROCEDURE — 80061 LIPID PANEL: CPT

## 2024-04-26 PROCEDURE — 85025 COMPLETE CBC W/AUTO DIFF WBC: CPT

## 2024-04-26 PROCEDURE — 78452 HT MUSCLE IMAGE SPECT MULT: CPT | Mod: 26

## 2024-04-26 PROCEDURE — 84100 ASSAY OF PHOSPHORUS: CPT

## 2024-04-26 PROCEDURE — 80053 COMPREHEN METABOLIC PANEL: CPT

## 2024-04-26 PROCEDURE — 83735 ASSAY OF MAGNESIUM: CPT

## 2024-04-26 PROCEDURE — A9500: CPT

## 2024-04-26 PROCEDURE — 85027 COMPLETE CBC AUTOMATED: CPT

## 2024-04-26 PROCEDURE — 82330 ASSAY OF CALCIUM: CPT

## 2024-04-26 PROCEDURE — 82803 BLOOD GASES ANY COMBINATION: CPT

## 2024-04-26 PROCEDURE — 84295 ASSAY OF SERUM SODIUM: CPT

## 2024-04-26 PROCEDURE — 36415 COLL VENOUS BLD VENIPUNCTURE: CPT

## 2024-04-26 PROCEDURE — 83036 HEMOGLOBIN GLYCOSYLATED A1C: CPT

## 2024-04-26 PROCEDURE — 81003 URINALYSIS AUTO W/O SCOPE: CPT

## 2024-04-26 PROCEDURE — 82947 ASSAY GLUCOSE BLOOD QUANT: CPT

## 2024-04-26 PROCEDURE — 84484 ASSAY OF TROPONIN QUANT: CPT

## 2024-04-26 PROCEDURE — 84132 ASSAY OF SERUM POTASSIUM: CPT

## 2024-04-26 PROCEDURE — 99285 EMERGENCY DEPT VISIT HI MDM: CPT

## 2024-04-26 PROCEDURE — 93016 CV STRESS TEST SUPVJ ONLY: CPT

## 2024-04-26 PROCEDURE — 93018 CV STRESS TEST I&R ONLY: CPT

## 2024-04-26 PROCEDURE — 84436 ASSAY OF TOTAL THYROXINE: CPT

## 2024-04-26 PROCEDURE — 84480 ASSAY TRIIODOTHYRONINE (T3): CPT

## 2024-04-26 PROCEDURE — 93017 CV STRESS TEST TRACING ONLY: CPT

## 2024-04-26 PROCEDURE — C8929: CPT

## 2024-04-26 PROCEDURE — 84443 ASSAY THYROID STIM HORMONE: CPT

## 2024-04-26 PROCEDURE — 78452 HT MUSCLE IMAGE SPECT MULT: CPT | Mod: MC

## 2024-04-26 PROCEDURE — 93308 TTE F-UP OR LMTD: CPT

## 2024-04-26 PROCEDURE — 80048 BASIC METABOLIC PNL TOTAL CA: CPT

## 2024-04-26 PROCEDURE — 82435 ASSAY OF BLOOD CHLORIDE: CPT

## 2024-04-26 PROCEDURE — 85014 HEMATOCRIT: CPT

## 2024-04-26 PROCEDURE — 83880 ASSAY OF NATRIURETIC PEPTIDE: CPT

## 2024-04-26 PROCEDURE — 83605 ASSAY OF LACTIC ACID: CPT

## 2024-04-26 RX ORDER — AMLODIPINE BESYLATE 2.5 MG/1
5 TABLET ORAL ONCE
Refills: 0 | Status: COMPLETED | OUTPATIENT
Start: 2024-04-26 | End: 2024-04-26

## 2024-04-26 RX ORDER — AMLODIPINE BESYLATE 2.5 MG/1
1 TABLET ORAL
Qty: 30 | Refills: 0
Start: 2024-04-26 | End: 2024-05-25

## 2024-04-26 RX ORDER — METOPROLOL TARTRATE 50 MG
1 TABLET ORAL
Qty: 30 | Refills: 0
Start: 2024-04-26 | End: 2024-05-25

## 2024-04-26 RX ADMIN — LOSARTAN POTASSIUM 100 MILLIGRAM(S): 100 TABLET, FILM COATED ORAL at 05:42

## 2024-04-26 RX ADMIN — AMLODIPINE BESYLATE 5 MILLIGRAM(S): 2.5 TABLET ORAL at 16:11

## 2024-04-26 RX ADMIN — Medication 50 MILLIGRAM(S): at 05:42

## 2024-04-26 RX ADMIN — Medication 650 MILLIGRAM(S): at 14:26

## 2024-04-26 RX ADMIN — Medication 650 MILLIGRAM(S): at 12:57

## 2024-04-26 RX ADMIN — AMLODIPINE BESYLATE 5 MILLIGRAM(S): 2.5 TABLET ORAL at 05:41

## 2024-04-26 RX ADMIN — PANTOPRAZOLE SODIUM 40 MILLIGRAM(S): 20 TABLET, DELAYED RELEASE ORAL at 05:42

## 2024-04-26 NOTE — DISCHARGE NOTE NURSING/CASE MANAGEMENT/SOCIAL WORK - NSDCPEFALRISK_GEN_ALL_CORE
For information on Fall & Injury Prevention, visit: [email or web address] OR  [email or web address] OR  https://www.cdc.gov/steadi/patient.html

## 2024-04-26 NOTE — DISCHARGE NOTE NURSING/CASE MANAGEMENT/SOCIAL WORK - PATIENT PORTAL LINK FT
You can access the FollowMyHealth Patient Portal offered by [facility] by registering at the following website: [email or web address]. By joining [facility]’s FollowMyHealth portal, you will also be able to view your health information using other applications (apps) compatible with our system.

## 2024-04-26 NOTE — DISCHARGE NOTE PROVIDER - CARE PROVIDER_API CALL
Juan Paris  Internal Medicine  [address] [place] [id number]  Phone: [phone]  Fax: [phone]  Follow Up Time: 1 week

## 2024-04-26 NOTE — DISCHARGE NOTE PROVIDER - HOSPITAL COURSE
73-year-old male with past medical history of BPH, anxiety, GERD, hyperlipidemia, hypertension, JUAN FRANCISCO, known right bundle branch block, prediabetes, left inguinal hernia repair presents with palpitations.    Patient states that he has had palpitations and some chest pressure that started up 10 AM on 4/23.  He has been documenting his blood pressures which she states have been high for the past couple weeks. Pt also notes he has been eating a lot chinese and Mexican food in the past couple weeks. On 4/18 he started taking Norvasc 2.5 mg in addition to the LOSARTAN 100 mg he has been taking for many years.   His last stress and echo were 15 years ago. He knows he has an enlarged prostate with a PSA in the 4s, but hasn't had an US, nor has seen a Urologist. Pt also has never had a screening colonoscopy. Pt is a retired internist/endocrinologist.  Denies shortness of breath, lightheadedness, leg swelling, long stationary periods.  No prior history of arrhythmias. Denies dysuria, cough, rhinorrhea, fevers.       Hospital Course:  N. Stress Test performed w/Normal myocardial perfusion scan, with no evidence of infarction or inducible ischemia. EF= 62%  TTE unremarkable.       Important Medication Changes and Reason:  Norvasc  & Toprol added and dose adjusted     Active or Pending Issues Requiring Follow-up:    Advanced Directives:   [x ] Full code  [ ] DNR  [ ] Hospice    Discharge Diagnoses:  Hypertensive urgency  Atrial tachycardia

## 2024-04-26 NOTE — DISCHARGE NOTE PROVIDER - DISCHARGE SERVICE FOR PATIENT
on the discharge service for the patient. I have reviewed and made amendments to the documentation where necessary. detailed exam negative

## 2024-04-26 NOTE — PROGRESS NOTE ADULT - TIME BILLING
Agree with above ACP note.  cv stable  eps f/u noted  mcot on d/c   f/u stress testing  cont current tx
Agree with above ACP note.  having stress testing   cv stable  eps f/u noted  mcot on d/c   f/u stress testing  cont current tx
agree with above  cv stable  no evidence of afib (PAT on admission)  pt now in NSR  cont BB  appreciate EP eval- recommend 2 week Zio XT external monitor to assess AT burden upon d/c  f/u echo

## 2024-04-26 NOTE — PROGRESS NOTE ADULT - SUBJECTIVE AND OBJECTIVE BOX
Patient is a 73y old  Male who presents with a chief complaint of hypertensive urgency, atrial tachycardia (25 Apr 2024 10:11)      SUBJECTIVE / OVERNIGHT EVENTS: stress test was deferred 2/2 elevated BP, norvasc 5 mg added, toprol XL dose raised to 50, now BP much improved, will cont trending    MEDICATIONS  (STANDING):  acetaminophen     Tablet .. 650 milliGRAM(s) Oral every 6 hours  amLODIPine   Tablet 5 milliGRAM(s) Oral daily  atorvastatin 40 milliGRAM(s) Oral at bedtime  LORazepam     Tablet 1 milliGRAM(s) Oral daily  losartan 100 milliGRAM(s) Oral daily  pantoprazole    Tablet 40 milliGRAM(s) Oral before breakfast  polyethylene glycol 3350 17 Gram(s) Oral daily  sodium chloride 0.45%. 1000 milliLiter(s) (75 mL/Hr) IV Continuous <Continuous>    MEDICATIONS  (PRN):  melatonin 5 milliGRAM(s) Oral at bedtime PRN Insomnia  sodium chloride 0.65% Nasal 1 Spray(s) Both Nostrils two times a day PRN Nasal Congestion      Vital Signs Last 24 Hrs  T(F): 98.7 (04-25-24 @ 20:15), Max: 98.8 (04-25-24 @ 04:52)  HR: 76 (04-25-24 @ 20:15) (71 - 78)  BP: 145/82 (04-25-24 @ 20:15) (132/80 - 188/98)  RR: 18 (04-25-24 @ 04:52) (18 - 18)  SpO2: 97% (04-25-24 @ 04:52) (97% - 97%)  Telemetry:   CAPILLARY BLOOD GLUCOSE        I&O's Summary    24 Apr 2024 07:01  -  25 Apr 2024 07:00  --------------------------------------------------------  IN: 1045 mL / OUT: 0 mL / NET: 1045 mL    25 Apr 2024 07:01  -  25 Apr 2024 22:15  --------------------------------------------------------  IN: 520 mL / OUT: 0 mL / NET: 520 mL        PHYSICAL EXAM:  GENERAL: NAD, well-developed  HEAD:  Atraumatic, Normocephalic  EYES: EOMI, PERRLA, conjunctiva and sclera clear  NECK: Supple, No JVD  CHEST/LUNG: Clear to auscultation bilaterally; No wheeze  HEART: Regular rate and rhythm; No murmurs, rubs, or gallops  ABDOMEN: Soft, Nontender, Nondistended; Bowel sounds present  EXTREMITIES:  2+ Peripheral Pulses, No clubbing, cyanosis, or edema  PSYCH: AAOx3  NEUROLOGY: non-focal  SKIN: No rashes or lesions    LABS:                        16.5   8.98  )-----------( 393      ( 25 Apr 2024 07:21 )             50.2     04-24    142  |  105  |  14  ----------------------------<  92  3.9   |  21<L>  |  0.76    Ca    9.8      24 Apr 2024 07:10            Urinalysis Basic - ( 24 Apr 2024 07:10 )    Color: x / Appearance: x / SG: x / pH: x  Gluc: 92 mg/dL / Ketone: x  / Bili: x / Urobili: x   Blood: x / Protein: x / Nitrite: x   Leuk Esterase: x / RBC: x / WBC x   Sq Epi: x / Non Sq Epi: x / Bacteria: x        RADIOLOGY & ADDITIONAL TESTS:    Imaging Personally Reviewed:    Consultant(s) Notes Reviewed:      Care Discussed with Consultants/Other Providers:  
Patient is a 73y old  Male who presents with a chief complaint of hypertensive urgency, atrial tachycardia (26 Apr 2024 15:56)      SUBJECTIVE / OVERNIGHT EVENTS: stress test neg for ischemia, BP controlled better, will raise Norvasc to 10 mg daily. outptn pcp F/U    MEDICATIONS  (STANDING):  acetaminophen     Tablet .. 650 milliGRAM(s) Oral every 6 hours  amLODIPine   Tablet 5 milliGRAM(s) Oral daily  atorvastatin 40 milliGRAM(s) Oral at bedtime  LORazepam     Tablet 1 milliGRAM(s) Oral daily  losartan 100 milliGRAM(s) Oral daily  metoprolol succinate ER 50 milliGRAM(s) Oral daily  pantoprazole    Tablet 40 milliGRAM(s) Oral before breakfast  polyethylene glycol 3350 17 Gram(s) Oral daily  sodium chloride 0.45%. 1000 milliLiter(s) (75 mL/Hr) IV Continuous <Continuous>    MEDICATIONS  (PRN):  melatonin 5 milliGRAM(s) Oral at bedtime PRN Insomnia  sodium chloride 0.65% Nasal 1 Spray(s) Both Nostrils two times a day PRN Nasal Congestion      Vital Signs Last 24 Hrs  T(F): 98.3 (04-26-24 @ 12:30), Max: 98.5 (04-26-24 @ 04:53)  HR: 69 (04-26-24 @ 17:03) (66 - 76)  BP: 150/84 (04-26-24 @ 17:03) (118/64 - 182/101)  RR: 18 (04-26-24 @ 12:30) (18 - 18)  SpO2: 96% (04-26-24 @ 12:30) (96% - 96%)  Telemetry:   CAPILLARY BLOOD GLUCOSE        I&O's Summary    25 Apr 2024 07:01  -  26 Apr 2024 07:00  --------------------------------------------------------  IN: 520 mL / OUT: 0 mL / NET: 520 mL    26 Apr 2024 07:01  -  26 Apr 2024 22:45  --------------------------------------------------------  IN: 480 mL / OUT: 0 mL / NET: 480 mL        PHYSICAL EXAM:  GENERAL: NAD, well-developed  HEAD:  Atraumatic, Normocephalic  EYES: EOMI, PERRLA, conjunctiva and sclera clear  NECK: Supple, No JVD  CHEST/LUNG: Clear to auscultation bilaterally; No wheeze  HEART: Regular rate and rhythm; No murmurs, rubs, or gallops  ABDOMEN: Soft, Nontender, Nondistended; Bowel sounds present  EXTREMITIES:  2+ Peripheral Pulses, No clubbing, cyanosis, or edema  PSYCH: AAOx3  NEUROLOGY: non-focal  SKIN: No rashes or lesions    LABS:                        16.5   8.98  )-----------( 393      ( 25 Apr 2024 07:21 )             50.2                     RADIOLOGY & ADDITIONAL TESTS:    Imaging Personally Reviewed:    Consultant(s) Notes Reviewed:      Care Discussed with Consultants/Other Providers:  
CARDIOLOGY FOLLOW UP - Dr. Huff  DATE OF SERVICE: 4/26/24    CC  No cv complaints     REVIEW OF SYSTEMS:  CONSTITUTIONAL: No fever, weight loss, or fatigue  RESPIRATORY: No cough, wheezing, chills or hemoptysis; No Shortness of Breath  CARDIOVASCULAR: No chest pain, palpitations, passing out, dizziness, or leg swelling  GASTROINTESTINAL: No abdominal or epigastric pain. No nausea, vomiting, or hematemesis; No diarrhea or constipation. No melena or hematochezia.  VASCULAR: No edema     PHYSICAL EXAM:  T(C): 36.9 (04-26-24 @ 04:53), Max: 37.1 (04-25-24 @ 20:15)  HR: 73 (04-26-24 @ 06:53) (66 - 76)  BP: 158/90 (04-26-24 @ 08:40) (118/64 - 188/98)  RR: 18 (04-26-24 @ 04:53) (18 - 18)  SpO2: 96% (04-26-24 @ 04:53) (96% - 96%)  Wt(kg): --  I&O's Summary    25 Apr 2024 07:01  -  26 Apr 2024 07:00  --------------------------------------------------------  IN: 520 mL / OUT: 0 mL / NET: 520 mL        Appearance: Normal	  Cardiovascular: Normal S1 S2,RRR, No JVD, No murmurs  Respiratory: Lungs clear to auscultation b/l   Gastrointestinal:  Soft, Non-tender, + BS	  Extremities: Normal range of motion, No clubbing, cyanosis or edema      Home Medications:  amLODIPine 2.5 mg oral tablet: 1 tab(s) orally once a day (23 Apr 2024 14:54)  LORazepam 1 mg oral tablet: 1 tab(s) orally once a day as needed (23 Apr 2024 14:49)  losartan 100 mg oral tablet: 1 tab(s) orally once a day (23 Apr 2024 14:49)  MiraLax oral powder for reconstitution: 17 gram(s) orally once a day (23 Apr 2024 14:45)  Prevacid 15 mg oral delayed release capsule: 1 cap(s) orally once a day (23 Apr 2024 14:49)  rosuvastatin 10 mg oral tablet: 1 tab(s) orally once a day (23 Apr 2024 14:42)      MEDICATIONS  (STANDING):  acetaminophen     Tablet .. 650 milliGRAM(s) Oral every 6 hours  amLODIPine   Tablet 5 milliGRAM(s) Oral daily  atorvastatin 40 milliGRAM(s) Oral at bedtime  LORazepam     Tablet 1 milliGRAM(s) Oral daily  losartan 100 milliGRAM(s) Oral daily  metoprolol succinate ER 50 milliGRAM(s) Oral daily  pantoprazole    Tablet 40 milliGRAM(s) Oral before breakfast  polyethylene glycol 3350 17 Gram(s) Oral daily  sodium chloride 0.45%. 1000 milliLiter(s) (75 mL/Hr) IV Continuous <Continuous>      TELEMETRY: SR 70s     ECG:  	  RADIOLOGY:   DIAGNOSTIC TESTING:  [ ] Echocardiogram:  [ ]  Catheterization:  [ ] Stress Test:    OTHER: 	    LABS:	 	    Troponin T, High Sensitivity Result: 21 ng/L [0 - 51] (04-23 @ 15:08)  Troponin T, High Sensitivity Result: 20 ng/L [0 - 51] (04-23 @ 11:59)                          16.5   8.98  )-----------( 393      ( 25 Apr 2024 07:21 )             50.2                   
Patient is a 73y old  Male who presents with a chief complaint of hypertensive urgency, atrial tachycardia (24 Apr 2024 09:16)      SUBJECTIVE / OVERNIGHT EVENTS: TTE is stable, BP much improved on Losartan 100, Metoprolol 12.5 mg bid, awaiting stress test    MEDICATIONS  (STANDING):  acetaminophen     Tablet .. 650 milliGRAM(s) Oral every 6 hours  atorvastatin 40 milliGRAM(s) Oral at bedtime  LORazepam     Tablet 1 milliGRAM(s) Oral daily  losartan 100 milliGRAM(s) Oral daily  metoprolol tartrate 12.5 milliGRAM(s) Oral two times a day  pantoprazole    Tablet 40 milliGRAM(s) Oral before breakfast  polyethylene glycol 3350 17 Gram(s) Oral daily  sodium chloride 0.45%. 1000 milliLiter(s) (75 mL/Hr) IV Continuous <Continuous>    MEDICATIONS  (PRN):  melatonin 5 milliGRAM(s) Oral at bedtime PRN Insomnia  sodium chloride 0.65% Nasal 1 Spray(s) Both Nostrils two times a day PRN Nasal Congestion      Vital Signs Last 24 Hrs  T(F): 98.2 (04-24-24 @ 20:45), Max: 98.3 (04-24-24 @ 16:03)  HR: 70 (04-24-24 @ 20:45) (70 - 83)  BP: 134/73 (04-24-24 @ 20:45) (110/66 - 158/94)  RR: 18 (04-24-24 @ 20:45) (18 - 18)  SpO2: 95% (04-24-24 @ 20:45) (94% - 96%)  Telemetry:   CAPILLARY BLOOD GLUCOSE        I&O's Summary    23 Apr 2024 07:01  -  24 Apr 2024 07:00  --------------------------------------------------------  IN: 180 mL / OUT: 0 mL / NET: 180 mL    24 Apr 2024 07:01  -  24 Apr 2024 23:02  --------------------------------------------------------  IN: 220 mL / OUT: 0 mL / NET: 220 mL        PHYSICAL EXAM:  GENERAL: NAD, well-developed  HEAD:  Atraumatic, Normocephalic  EYES: EOMI, PERRLA, conjunctiva and sclera clear  NECK: Supple, No JVD  CHEST/LUNG: Clear to auscultation bilaterally; No wheeze  HEART: Regular rate and rhythm; No murmurs, rubs, or gallops  ABDOMEN: Soft, Nontender, Nondistended; Bowel sounds present  EXTREMITIES:  2+ Peripheral Pulses, No clubbing, cyanosis, or edema  PSYCH: AAOx3  NEUROLOGY: non-focal  SKIN: No rashes or lesions    LABS:                        16.7   10.63 )-----------( 435      ( 24 Apr 2024 07:44 )             50.8     04-24    142  |  105  |  14  ----------------------------<  92  3.9   |  21<L>  |  0.76    Ca    9.8      24 Apr 2024 07:10  Phos  3.8     04-23  Mg     2.2     04-23    TPro  7.8  /  Alb  5.3<H>  /  TBili  1.1  /  DBili  x   /  AST  28  /  ALT  21  /  AlkPhos  97  04-23          Urinalysis Basic - ( 24 Apr 2024 07:10 )    Color: x / Appearance: x / SG: x / pH: x  Gluc: 92 mg/dL / Ketone: x  / Bili: x / Urobili: x   Blood: x / Protein: x / Nitrite: x   Leuk Esterase: x / RBC: x / WBC x   Sq Epi: x / Non Sq Epi: x / Bacteria: x        RADIOLOGY & ADDITIONAL TESTS:    Imaging Personally Reviewed:    Consultant(s) Notes Reviewed:      Care Discussed with Consultants/Other Providers:  
CARDIOLOGY FOLLOW UP - Dr. Huff  DATE OF SERVICE: 4/24/24    CC  No cv complaints     REVIEW OF SYSTEMS:  CONSTITUTIONAL: No fever, weight loss, or fatigue  RESPIRATORY: No cough, wheezing, chills or hemoptysis; No Shortness of Breath  CARDIOVASCULAR: No chest pain, palpitations, passing out, dizziness, or leg swelling  GASTROINTESTINAL: No abdominal or epigastric pain. No nausea, vomiting, or hematemesis; No diarrhea or constipation. No melena or hematochezia.  VASCULAR: No edema     PHYSICAL EXAM:  T(C): 36.8 (04-24-24 @ 08:30), Max: 36.8 (04-24-24 @ 04:11)  HR: 73 (04-24-24 @ 08:30) (73 - 164)  BP: 158/94 (04-24-24 @ 08:30) (110/66 - 203/117)  RR: 18 (04-24-24 @ 08:30) (18 - 20)  SpO2: 96% (04-24-24 @ 08:30) (93% - 98%)  Wt(kg): --  I&O's Summary    23 Apr 2024 07:01  -  24 Apr 2024 07:00  --------------------------------------------------------  IN: 180 mL / OUT: 0 mL / NET: 180 mL        Appearance: Normal	  Cardiovascular: Normal S1 S2,RRR, No JVD, No murmurs  Respiratory: Lungs clear to auscultation b/l   Gastrointestinal:  Soft, Non-tender, + BS	  Extremities: Normal range of motion, No clubbing, cyanosis or edema      Home Medications:  amLODIPine 2.5 mg oral tablet: 1 tab(s) orally once a day (23 Apr 2024 14:54)  LORazepam 1 mg oral tablet: 1 tab(s) orally once a day as needed (23 Apr 2024 14:49)  losartan 100 mg oral tablet: 1 tab(s) orally once a day (23 Apr 2024 14:49)  MiraLax oral powder for reconstitution: 17 gram(s) orally once a day (23 Apr 2024 14:45)  Prevacid 15 mg oral delayed release capsule: 1 cap(s) orally once a day (23 Apr 2024 14:49)  rosuvastatin 10 mg oral tablet: 1 tab(s) orally once a day (23 Apr 2024 14:42)      MEDICATIONS  (STANDING):  atorvastatin 40 milliGRAM(s) Oral at bedtime  LORazepam     Tablet 1 milliGRAM(s) Oral daily  losartan 100 milliGRAM(s) Oral daily  metoprolol tartrate 12.5 milliGRAM(s) Oral two times a day  pantoprazole    Tablet 40 milliGRAM(s) Oral before breakfast  polyethylene glycol 3350 17 Gram(s) Oral daily  sodium chloride 0.45%. 1000 milliLiter(s) (75 mL/Hr) IV Continuous <Continuous>      TELEMETRY: SR 80s 	    ECG:  	  RADIOLOGY:   DIAGNOSTIC TESTING:  [ ] Echocardiogram:  [ ]  Catheterization:  [ ] Stress Test:    OTHER: 	    LABS:	 	    Troponin T, High Sensitivity Result: 21 ng/L [0 - 51] (04-23 @ 15:08)  Troponin T, High Sensitivity Result: 20 ng/L [0 - 51] (04-23 @ 11:59)                          16.7   10.63 )-----------( 435      ( 24 Apr 2024 07:44 )             50.8     04-24    142  |  105  |  14  ----------------------------<  92  3.9   |  21<L>  |  0.76    Ca    9.8      24 Apr 2024 07:10  Phos  3.8     04-23  Mg     2.2     04-23    TPro  7.8  /  Alb  5.3<H>  /  TBili  1.1  /  DBili  x   /  AST  28  /  ALT  21  /  AlkPhos  97  04-23            
CARDIOLOGY FOLLOW UP - Dr. Huff  DATE OF SERVICE: 4/25/24    CC  No cv complaints     REVIEW OF SYSTEMS:  CONSTITUTIONAL: No fever, weight loss, or fatigue  RESPIRATORY: No cough, wheezing, chills or hemoptysis; No Shortness of Breath  CARDIOVASCULAR: No chest pain, palpitations, passing out, dizziness, or leg swelling  GASTROINTESTINAL: No abdominal or epigastric pain. No nausea, vomiting, or hematemesis; No diarrhea or constipation. No melena or hematochezia.  VASCULAR: No edema     PHYSICAL EXAM:  T(C): 37.1 (04-25-24 @ 04:52), Max: 37.1 (04-25-24 @ 04:52)  HR: 78 (04-25-24 @ 04:52) (70 - 78)  BP: 180/93 (04-25-24 @ 08:15) (134/73 - 180/93)  RR: 18 (04-25-24 @ 04:52) (18 - 18)  SpO2: 97% (04-25-24 @ 04:52) (94% - 97%)  Wt(kg): --  I&O's Summary    24 Apr 2024 07:01  -  25 Apr 2024 07:00  --------------------------------------------------------  IN: 1045 mL / OUT: 0 mL / NET: 1045 mL        Appearance: Normal	  Cardiovascular: Normal S1 S2,RRR, No JVD, No murmurs  Respiratory: Lungs clear to auscultation b/l   Gastrointestinal:  Soft, Non-tender, + BS	  Extremities: Normal range of motion, No clubbing, cyanosis or edema      Home Medications:  amLODIPine 2.5 mg oral tablet: 1 tab(s) orally once a day (23 Apr 2024 14:54)  LORazepam 1 mg oral tablet: 1 tab(s) orally once a day as needed (23 Apr 2024 14:49)  losartan 100 mg oral tablet: 1 tab(s) orally once a day (23 Apr 2024 14:49)  MiraLax oral powder for reconstitution: 17 gram(s) orally once a day (23 Apr 2024 14:45)  Prevacid 15 mg oral delayed release capsule: 1 cap(s) orally once a day (23 Apr 2024 14:49)  rosuvastatin 10 mg oral tablet: 1 tab(s) orally once a day (23 Apr 2024 14:42)      MEDICATIONS  (STANDING):  acetaminophen     Tablet .. 650 milliGRAM(s) Oral every 6 hours  amLODIPine   Tablet 5 milliGRAM(s) Oral daily  atorvastatin 40 milliGRAM(s) Oral at bedtime  LORazepam     Tablet 1 milliGRAM(s) Oral daily  losartan 100 milliGRAM(s) Oral daily  metoprolol succinate ER 25 milliGRAM(s) Oral daily  pantoprazole    Tablet 40 milliGRAM(s) Oral before breakfast  polyethylene glycol 3350 17 Gram(s) Oral daily  sodium chloride 0.45%. 1000 milliLiter(s) (75 mL/Hr) IV Continuous <Continuous>      TELEMETRY: NSR 	    ECG:  	  RADIOLOGY:   DIAGNOSTIC TESTING:  [x] Echocardiogram:  < from: TTE W or WO Ultrasound Enhancing Agent (04.24.24 @ 13:54) >  CONCLUSIONS:     1. Left ventricular cavity is normal in size. Left ventricular wall thickness is normal. Left ventricular systolic function is normal with an ejection fraction of 58 % by Hill's method of disks. There are no regional wall motion abnormalities seen.   2. Normal left ventricular diastolic function, with normal filling pressure.   3. Mildly enlarged right ventricular cavity size, with normal wall thickness, and probably normal systolic function.    < end of copied text >    [ ]  Catheterization:  [ ] Stress Test:    OTHER: 	    LABS:	 	    Troponin T, High Sensitivity Result: 21 ng/L [0 - 51] (04-23 @ 15:08)  Troponin T, High Sensitivity Result: 20 ng/L [0 - 51] (04-23 @ 11:59)                          16.5   8.98  )-----------( 393      ( 25 Apr 2024 07:21 )             50.2     04-24    142  |  105  |  14  ----------------------------<  92  3.9   |  21<L>  |  0.76    Ca    9.8      24 Apr 2024 07:10  Phos  3.8     04-23  Mg     2.2     04-23    TPro  7.8  /  Alb  5.3<H>  /  TBili  1.1  /  DBili  x   /  AST  28  /  ALT  21  /  AlkPhos  97  04-23

## 2024-04-26 NOTE — PROGRESS NOTE ADULT - ASSESSMENT
A/p  73-year-old male with past medical history of BPH, hyperlipidemia, hypertension, JUAN FRANCISCO, known right bundle branch block, prediabetes, left inguinal hernia repair presents with palpitations since this morning.       #Palpitations  -Initially c/f afib, however ecg appears to be regular with p-waves - likely atrial tachycardia  -Has known hx of RBBB  -Now SR on tele   -Continue metoprolol 12.5mg bid  -Appreciate EP eval- recommend 2 week Zio XT external monitor to assess AT burden upon d/c  -TSH wnl  -Echo nml lv fxn, no wma, no sig valvular dz  -Plan for nuclear stress test today    #HTN  -Continue losartan & bb  -Start amlodipine 5mg qd    #HLD  -Cont statin      dvt ppx  d/w acp   
73-year-old male with past medical history of BPH, anxiety, GERD, hyperlipidemia, hypertension, JUAN FRANCISCO, known right bundle branch block, prediabetes, left inguinal hernia repair presents with palpitations since this morning.    Patient states that he has had palpitations and some chest pressure that started up 10 AM on 4/23( today).  He has been documenting his blood pressures which she states have been high for the past couple weeks. ptn also notes he has been eating a lot chinese and Mexican food in the past couple weeks.  On 4/18 he started taking Norvasc 2.5 mg in addition to the LOSARTAN 100 mg he has been taking for many years.   His last stress and echo were 15 years ago. he knows he has an enlarged prostate w a psa in the 4s, but hasnt had an US , nor has seen a Urologist. Ptn also has never had a screening colonoscopy. 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  No prior history of arrhythmias. Denies dysuria, cough, rhinorrhea, fevers.     Hypertensive urgency  Tachycardia, prob atrial reactive  ACS    - TTE is stable,   - stress test neg for ischemia, BP controlled better, will raise Norvasc to 10 mg daily. outptn pcp F/U  - no events on tele  - HR: 70-80  - nl  TFTs  - PSA pending  -HA1C 6.3%  - ptn is euvolemic  - Hgb improved post IVF: 16,5  - cont PPI  - DVT ppx w sc Lovenox    
73-year-old male with past medical history of BPH, anxiety, GERD, hyperlipidemia, hypertension, JUAN FRANCISCO, known right bundle branch block, prediabetes, left inguinal hernia repair presents with palpitations since this morning.    Patient states that he has had palpitations and some chest pressure that started up 10 AM on 4/23( today).  He has been documenting his blood pressures which she states have been high for the past couple weeks. ptn also notes he has been eating a lot chinese and Mexican food in the past couple weeks.  On 4/18 he started taking Norvasc 2.5 mg in addition to the LOSARTAN 100 mg he has been taking for many years.   His last stress and echo were 15 years ago. he knows he has an enlarged prostate w a psa in the 4s, but hasnt had an US , nor has seen a Urologist. Ptn also has never had a screening colonoscopy. 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  No prior history of arrhythmias. Denies dysuria, cough, rhinorrhea, fevers.     Hypertensive urgency  Tachycardia, prob atrial reactive  ACS    - TTE is stable, BP much improved on Losartan 100, Metoprolol 12.5 mg bid, awaiting stress test  - no events on tele  - HR: 70-80  - nl  TFTs  - PSA pending  -HA1C 6.3%  - DC IVF, ptn is euvolemic  - Hgb improved post IVF: 16,7  - cont PPI  - DVT ppx w sc Lovenox    
  A/p  73-year-old male with past medical history of BPH, hyperlipidemia, hypertension, JUAN FRANCISCO, known right bundle branch block, prediabetes, left inguinal hernia repair presents with palpitations since this morning.       #Palpitations  -Initially c/f afib, however ecg appears to be regular with p-waves - likely atrial tachycardia  -Has known hx of RBBB  -Now SR on tele   -Continue metoprolol 12.5mg bid  -Appreciate EP eval- recommend 2 week Zio XT external monitor to assess AT burden upon d/c  -F/u TSH  -R/o infectious causes per med  -F/u echo     #HTN  -Continue losartan & bb  -Start amlodipine 5mg qd    #HLD  -Cont statin      dvt ppx  d/w acp   
73-year-old male with past medical history of BPH, anxiety, GERD, hyperlipidemia, hypertension, JUAN FRANCISCO, known right bundle branch block, prediabetes, left inguinal hernia repair presents with palpitations since this morning.    Patient states that he has had palpitations and some chest pressure that started up 10 AM on 4/23( today).  He has been documenting his blood pressures which she states have been high for the past couple weeks. ptn also notes he has been eating a lot chinese and Mexican food in the past couple weeks.  On 4/18 he started taking Norvasc 2.5 mg in addition to the LOSARTAN 100 mg he has been taking for many years.   His last stress and echo were 15 years ago. he knows he has an enlarged prostate w a psa in the 4s, but hasnt had an US , nor has seen a Urologist. Ptn also has never had a screening colonoscopy. 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  No prior history of arrhythmias. Denies dysuria, cough, rhinorrhea, fevers.     Hypertensive urgency  Tachycardia, prob atrial reactive  ACS    - TTE is stable,   - stress test was deferred 2/2 elevated BP, norvasc 5 mg added, toprol XL dose raised to 50, now BP much improved, will cont trending  - no events on tele  - HR: 70-80  - nl  TFTs  - PSA pending  -HA1C 6.3%  - ptn is euvolemic  - Hgb improved post IVF: 16,5  - cont PPI  - DVT ppx w sc Lovenox    
  A/p  73-year-old male with past medical history of BPH, hyperlipidemia, hypertension, JUAN FRANCISCO, known right bundle branch block, prediabetes, left inguinal hernia repair presents with palpitations since this morning.       #Palpitations  -Initially c/f afib, however ecg appears to be regular with p-waves - likely atrial tachycardia  -Has known hx of RBBB  -Now SR on tele   -Continue metoprolol 50mg qd   -Appreciate EP eval- recommend 2 week Zio XT external monitor to assess AT burden upon d/c  -TSH wnl  -Echo nml lv fxn, no wma, no sig valvular dz  -Plan for nuclear stress test today    #HTN  -Continue losartan & bb  -Continue amlodipine 5mg qd - can increase to 10mg if bp remains elevated    #HLD  -Cont statin      dvt ppx  d/w acp

## 2024-04-26 NOTE — DISCHARGE NOTE PROVIDER - NSDCMRMEDTOKEN_GEN_ALL_CORE_FT
amLODIPine 10 mg oral tablet: 1 tab(s) orally once a day  LORazepam 1 mg oral tablet: 1 tab(s) orally once a day as needed  losartan 100 mg oral tablet: 1 tab(s) orally once a day  metoprolol succinate 50 mg oral tablet, extended release: 1 tab(s) orally once a day  MiraLax oral powder for reconstitution: 17 gram(s) orally once a day  Prevacid 15 mg oral delayed release capsule: 1 cap(s) orally once a day  rosuvastatin 10 mg oral tablet: 1 tab(s) orally once a day

## 2024-04-26 NOTE — DISCHARGE NOTE PROVIDER - NSDCCPCAREPLAN_GEN_ALL_CORE_FT
PRINCIPAL DISCHARGE DIAGNOSIS  Diagnosis: Atrial tachycardia  Assessment and Plan of Treatment: Your Heart rate is better with the medication adjustments. Continue your new Metoprolol as prescribed and followup with your Primary MD within 1 week.      SECONDARY DISCHARGE DIAGNOSES  Diagnosis: Hypertensive urgency  Assessment and Plan of Treatment: You have started on a new medication for your blood pressure. Norvasc/Amlodipine should be continued as prescribed. Limit your salt intake. Restaurant food and pre-prepared foods tend to be high in sodium content. Ensure you are following a reduced salt diet and drinking adequate water. Monitor your blood pressure at home and keep a record of your values to review with your physician. Moderate exercise is encouraged at least 4-5 times a week.

## 2024-04-26 NOTE — PROGRESS NOTE ADULT - REASON FOR ADMISSION
hypertensive urgency, atrial tachycardia

## 2024-04-29 ENCOUNTER — TRANSCRIPTION ENCOUNTER (OUTPATIENT)
Age: 74
End: 2024-04-29

## 2024-10-16 ENCOUNTER — OFFICE (OUTPATIENT)
Age: 74
Setting detail: OPHTHALMOLOGY
End: 2024-10-16
Payer: COMMERCIAL

## 2024-10-16 DIAGNOSIS — H35.40: ICD-10-CM

## 2024-10-16 DIAGNOSIS — H25.13: ICD-10-CM

## 2024-10-16 DIAGNOSIS — H52.7: ICD-10-CM

## 2024-10-16 PROCEDURE — 92004 COMPRE OPH EXAM NEW PT 1/>: CPT | Performed by: OPHTHALMOLOGY

## 2024-10-16 ASSESSMENT — REFRACTION_MANIFEST
OS_AXIS: 090
OD_AXIS: 095
OD_CYLINDER: -2.50
OS_CYLINDER: -1.50
OS_SPHERE: -3.75
OD_SPHERE: -3.75

## 2024-10-16 ASSESSMENT — KERATOMETRY
OS_K1POWER_DIOPTERS: 42.25
METHOD_AUTO_MANUAL: AUTO
OS_K2POWER_DIOPTERS: 43.00
OD_AXISANGLE_DEGREES: 005
OS_AXISANGLE_DEGREES: 162
OD_K1POWER_DIOPTERS: 41.50
OD_K2POWER_DIOPTERS: 43.25

## 2024-10-16 ASSESSMENT — REFRACTION_CURRENTRX
OS_OVR_VA: 20/
OD_OVR_VA: 20/
OD_SPHERE: -2.50
OS_SPHERE: -3.00
OS_AXIS: 088
OD_CYLINDER: -1.50
OD_VPRISM_DIRECTION: SV
OD_AXIS: 100
OS_CYLINDER: -1.50

## 2024-10-16 ASSESSMENT — REFRACTION_AUTOREFRACTION
OS_AXIS: 089
OS_SPHERE: -4.00
OD_AXIS: 095
OD_SPHERE: -3.75
OS_CYLINDER: -1.50
OD_CYLINDER: -2.50

## 2024-10-16 ASSESSMENT — TONOMETRY
OS_IOP_MMHG: 17
OD_IOP_MMHG: 17

## 2024-10-16 ASSESSMENT — CONFRONTATIONAL VISUAL FIELD TEST (CVF)
OD_FINDINGS: FULL
OS_FINDINGS: FULL

## 2024-10-16 ASSESSMENT — VISUAL ACUITY
OS_BCVA: 20/40
OD_BCVA: 20/40-2

## 2024-10-16 ASSESSMENT — LID EXAM ASSESSMENTS
OS_BLEPHARITIS: LLL 2+
OD_BLEPHARITIS: RLL 2+

## 2025-05-20 ENCOUNTER — NON-APPOINTMENT (OUTPATIENT)
Age: 75
End: 2025-05-20

## 2025-05-28 ENCOUNTER — APPOINTMENT (OUTPATIENT)
Dept: SURGERY | Facility: CLINIC | Age: 75
End: 2025-05-28
Payer: MEDICARE

## 2025-05-28 VITALS
DIASTOLIC BLOOD PRESSURE: 88 MMHG | HEART RATE: 87 BPM | BODY MASS INDEX: 30.35 KG/M2 | OXYGEN SATURATION: 95 % | TEMPERATURE: 98 F | HEIGHT: 70 IN | WEIGHT: 212 LBS | SYSTOLIC BLOOD PRESSURE: 138 MMHG

## 2025-05-28 DIAGNOSIS — Z78.9 OTHER SPECIFIED HEALTH STATUS: ICD-10-CM

## 2025-05-28 DIAGNOSIS — K40.90 UNILATERAL INGUINAL HERNIA, W/OUT OBSTRUCTION OR GANGRENE, NOT SPECIFIED AS RECURRENT: ICD-10-CM

## 2025-05-28 DIAGNOSIS — E78.5 HYPERLIPIDEMIA, UNSPECIFIED: ICD-10-CM

## 2025-05-28 DIAGNOSIS — I10 ESSENTIAL (PRIMARY) HYPERTENSION: ICD-10-CM

## 2025-05-28 DIAGNOSIS — R73.03 PREDIABETES.: ICD-10-CM

## 2025-05-28 PROCEDURE — 99213 OFFICE O/P EST LOW 20 MIN: CPT

## 2025-05-28 RX ORDER — METOPROLOL SUCCINATE 25 MG/1
25 TABLET, EXTENDED RELEASE ORAL
Refills: 0 | Status: ACTIVE | COMMUNITY

## 2025-05-28 RX ORDER — LORAZEPAM 1 MG/1
1 TABLET ORAL
Refills: 0 | Status: ACTIVE | COMMUNITY

## 2025-05-28 RX ORDER — AMLODIPINE BESYLATE 5 MG/1
5 TABLET ORAL
Refills: 0 | Status: ACTIVE | COMMUNITY

## 2025-06-11 ENCOUNTER — OUTPATIENT (OUTPATIENT)
Dept: OUTPATIENT SERVICES | Facility: HOSPITAL | Age: 75
LOS: 1 days | End: 2025-06-11
Payer: MEDICARE

## 2025-06-11 VITALS
HEART RATE: 61 BPM | SYSTOLIC BLOOD PRESSURE: 133 MMHG | OXYGEN SATURATION: 97 % | RESPIRATION RATE: 17 BRPM | TEMPERATURE: 98 F | HEIGHT: 70 IN | WEIGHT: 220.02 LBS | DIASTOLIC BLOOD PRESSURE: 95 MMHG

## 2025-06-11 DIAGNOSIS — Z90.49 ACQUIRED ABSENCE OF OTHER SPECIFIED PARTS OF DIGESTIVE TRACT: Chronic | ICD-10-CM

## 2025-06-11 DIAGNOSIS — I10 ESSENTIAL (PRIMARY) HYPERTENSION: ICD-10-CM

## 2025-06-11 DIAGNOSIS — K40.90 UNILATERAL INGUINAL HERNIA, WITHOUT OBSTRUCTION OR GANGRENE, NOT SPECIFIED AS RECURRENT: ICD-10-CM

## 2025-06-11 DIAGNOSIS — Z98.890 OTHER SPECIFIED POSTPROCEDURAL STATES: Chronic | ICD-10-CM

## 2025-06-11 DIAGNOSIS — Z01.818 ENCOUNTER FOR OTHER PREPROCEDURAL EXAMINATION: ICD-10-CM

## 2025-06-11 LAB
ANION GAP SERPL CALC-SCNC: 6 MMOL/L — SIGNIFICANT CHANGE UP (ref 5–17)
BUN SERPL-MCNC: 14 MG/DL — SIGNIFICANT CHANGE UP (ref 7–23)
CALCIUM SERPL-MCNC: 9.9 MG/DL — SIGNIFICANT CHANGE UP (ref 8.5–10.1)
CHLORIDE SERPL-SCNC: 106 MMOL/L — SIGNIFICANT CHANGE UP (ref 96–108)
CO2 SERPL-SCNC: 27 MMOL/L — SIGNIFICANT CHANGE UP (ref 22–31)
CREAT SERPL-MCNC: 1.22 MG/DL — SIGNIFICANT CHANGE UP (ref 0.5–1.3)
EGFR: 62 ML/MIN/1.73M2 — SIGNIFICANT CHANGE UP
EGFR: 62 ML/MIN/1.73M2 — SIGNIFICANT CHANGE UP
GLUCOSE SERPL-MCNC: 130 MG/DL — HIGH (ref 70–99)
HCT VFR BLD CALC: 50.8 % — HIGH (ref 39–50)
HGB BLD-MCNC: 17.3 G/DL — HIGH (ref 13–17)
MCHC RBC-ENTMCNC: 29.6 PG — SIGNIFICANT CHANGE UP (ref 27–34)
MCHC RBC-ENTMCNC: 34.1 G/DL — SIGNIFICANT CHANGE UP (ref 32–36)
MCV RBC AUTO: 86.8 FL — SIGNIFICANT CHANGE UP (ref 80–100)
NRBC BLD AUTO-RTO: 0 /100 WBCS — SIGNIFICANT CHANGE UP (ref 0–0)
PLATELET # BLD AUTO: 437 K/UL — HIGH (ref 150–400)
POTASSIUM SERPL-MCNC: 4.5 MMOL/L — SIGNIFICANT CHANGE UP (ref 3.5–5.3)
POTASSIUM SERPL-SCNC: 4.5 MMOL/L — SIGNIFICANT CHANGE UP (ref 3.5–5.3)
RBC # BLD: 5.85 M/UL — HIGH (ref 4.2–5.8)
RBC # FLD: 13.3 % — SIGNIFICANT CHANGE UP (ref 10.3–14.5)
SODIUM SERPL-SCNC: 139 MMOL/L — SIGNIFICANT CHANGE UP (ref 135–145)
WBC # BLD: 9.35 K/UL — SIGNIFICANT CHANGE UP (ref 3.8–10.5)
WBC # FLD AUTO: 9.35 K/UL — SIGNIFICANT CHANGE UP (ref 3.8–10.5)

## 2025-06-11 PROCEDURE — 93010 ELECTROCARDIOGRAM REPORT: CPT

## 2025-06-11 NOTE — H&P PST ADULT - ASSESSMENT
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 Tate to be performed on 2025   CAPRINI SCORE    AGE RELATED RISK FACTORS                                                             [ ] Age 41-60 years                                            (1 Point)  [ x] Age: 61-74 years                                           (2 Points)                 [ ] Age= 75 years                                                (3 Points)             DISEASE RELATED RISK FACTORS                                                       [ ] Edema in the lower extremities                 (1 Point)                     [ ] Varicose veins                                               (1 Point)                                 [x ] BMI > 25 Kg/m2                                            (1 Point)                                  [ ] Serious infection (ie PNA)                            (1 Point)                     [ ] Lung disease ( COPD, Emphysema)            (1 Point)                                                                          [ ] Acute myocardial infarction                         (1 Point)                  [ ] Congestive heart failure (in the previous month)  (1 Point)         [ ] Inflammatory bowel disease                            (1 Point)                  [ ] Central venous access, PICC or Port               (2 points)       (within the last month)                                                                [ ] Stroke (in the previous month)                        (5 Points)    [ ] Previous or present malignancy                       (2 points)                                                                                                                                                         HEMATOLOGY RELATED FACTORS                                                         [ ] Prior episodes of VTE                                     (3 Points)                     [ ] Positive family history for VTE                      (3 Points)                  [ ] Prothrombin 85153 A                                     (3 Points)                     [ ] Factor V Leiden                                                (3 Points)                        [ ] Lupus anticoagulants                                      (3 Points)                                                           [ ] Anticardiolipin antibodies                              (3 Points)                                                       [ ] High homocysteine in the blood                   (3 Points)                                             [ ] Other congenital or acquired thrombophilia      (3 Points)                                                [ ] Heparin induced thrombocytopenia                  (3 Points)                                        MOBILITY RELATED FACTORS  [ ] Bed rest                                                         (1 Point)  [ ] Plaster cast                                                    (2 points)  [ ] Bed bound for more than 72 hours           (2 Points)    GENDER SPECIFIC FACTORS  [ ] Pregnancy or had a baby within the last month   (1 Point)  [ ] Post-partum < 6 weeks                                   (1 Point)  [ ] Hormonal therapy  or oral contraception   (1 Point)  [ ] History of pregnancy complications              (1 point)  [ ] Unexplained or recurrent              (1 Point)    OTHER RISK FACTORS                                           (1 Point)  [ ] BMI >40, smoking, diabetes requiring insulin, chemotherapy  blood transfusions and length of surgery over 2 hours    SURGERY RELATED RISK FACTORS  [ ]  Section within the last month     (1 Point)  [ ] Minor surgery                                                  (1 Point)  [ ] Arthroscopic surgery                                       (2 Points)  [x ] Planned major surgery lasting more            (2 Points)      than 45 minutes     [ ] Elective hip or knee joint replacement       (5 points)       surgery                                                TRAUMA RELATED RISK FACTORS  [ ] Fracture of the hip, pelvis, or leg                       (5 Points)  [ ] Spinal cord injury resulting in paralysis             (5 points)       (in the previous month)    [ ] Paralysis  (less than 1 month)                             (5 Points)  [ ] Multiple Trauma within 1 month                        (5 Points)    Total Score [       5 ]    Caprini Score 0-2: Low Risk, NO VTE prophylaxis required for most patients, encourage ambulation  Caprini Score 3-6: Moderate Risk , pharmacologic VTE prophylaxis is indicated for most patients (in the absence of contraindications)  Caprini Score Greater than or =7: High risk, pharmocologic VTE prophylaxis indicated for most patients (in the absence of contraindications)

## 2025-06-11 NOTE — H&P PST ADULT - PROBLEM SELECTOR PLAN 1
Robotic assisted laparoscopic right inguinal hernia repair   Pre-op instructions given, patient verbalized understanding  Chlorhexidine wash instructions given   medical clearance

## 2025-06-11 NOTE — H&P PST ADULT - HISTORY OF PRESENT ILLNESS
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 Tate to be performed on 06-

## 2025-06-24 ENCOUNTER — APPOINTMENT (OUTPATIENT)
Dept: SURGERY | Facility: HOSPITAL | Age: 75
End: 2025-06-24

## 2025-06-24 ENCOUNTER — TRANSCRIPTION ENCOUNTER (OUTPATIENT)
Age: 75
End: 2025-06-24

## 2025-06-24 ENCOUNTER — OUTPATIENT (OUTPATIENT)
Dept: OUTPATIENT SERVICES | Facility: HOSPITAL | Age: 75
LOS: 1 days | End: 2025-06-24
Payer: MEDICARE

## 2025-06-24 VITALS
OXYGEN SATURATION: 96 % | SYSTOLIC BLOOD PRESSURE: 108 MMHG | RESPIRATION RATE: 16 BRPM | HEART RATE: 92 BPM | DIASTOLIC BLOOD PRESSURE: 64 MMHG

## 2025-06-24 VITALS
DIASTOLIC BLOOD PRESSURE: 84 MMHG | WEIGHT: 220.02 LBS | HEART RATE: 93 BPM | OXYGEN SATURATION: 98 % | SYSTOLIC BLOOD PRESSURE: 153 MMHG | RESPIRATION RATE: 14 BRPM | TEMPERATURE: 99 F | HEIGHT: 70 IN

## 2025-06-24 DIAGNOSIS — Z90.49 ACQUIRED ABSENCE OF OTHER SPECIFIED PARTS OF DIGESTIVE TRACT: Chronic | ICD-10-CM

## 2025-06-24 DIAGNOSIS — Z98.890 OTHER SPECIFIED POSTPROCEDURAL STATES: Chronic | ICD-10-CM

## 2025-06-24 LAB
GLUCOSE BLDC GLUCOMTR-MCNC: 121 MG/DL — HIGH (ref 70–99)
GLUCOSE BLDC GLUCOMTR-MCNC: 122 MG/DL — HIGH (ref 70–99)

## 2025-06-24 PROCEDURE — 49650 LAP ING HERNIA REPAIR INIT: CPT | Mod: RT

## 2025-06-24 PROCEDURE — S2900 ROBOTIC SURGICAL SYSTEM: CPT | Mod: NC

## 2025-06-24 PROCEDURE — 49650 LAP ING HERNIA REPAIR INIT: CPT | Mod: AS,RT

## 2025-06-24 PROCEDURE — S2900 ROBOTIC SURGICAL SYSTEM: CPT | Mod: NC,AS

## 2025-06-24 DEVICE — ETHICON HERNIA SECURESTRAP 5MM SIZE 25: Type: IMPLANTABLE DEVICE | Site: RIGHT | Status: FUNCTIONAL

## 2025-06-24 DEVICE — CLIP APPLIER COVIDIEN ENDOCLIP III 5MM: Type: IMPLANTABLE DEVICE | Site: RIGHT | Status: FUNCTIONAL

## 2025-06-24 DEVICE — MESH HERNIA INGUINAL PROGRIP LAPAROSCOPIC 15 X 10CM LEFT: Type: IMPLANTABLE DEVICE | Site: RIGHT | Status: FUNCTIONAL

## 2025-06-24 DEVICE — MESH HERNIA INGUINAL PROGRIP LAPAROSCOPIC RIGHT: Type: IMPLANTABLE DEVICE | Site: RIGHT | Status: FUNCTIONAL

## 2025-06-24 RX ORDER — SODIUM CHLORIDE 9 G/1000ML
1000 INJECTION, SOLUTION INTRAVENOUS
Refills: 0 | Status: DISCONTINUED | OUTPATIENT
Start: 2025-06-24 | End: 2025-06-24

## 2025-06-24 RX ORDER — POLYETHYLENE GLYCOL 3350 17 G/17G
17 POWDER, FOR SOLUTION ORAL
Refills: 0 | DISCHARGE

## 2025-06-24 RX ORDER — METOPROLOL SUCCINATE 50 MG/1
1 TABLET, EXTENDED RELEASE ORAL
Refills: 0 | DISCHARGE

## 2025-06-24 RX ORDER — AMLODIPINE BESYLATE 10 MG/1
1 TABLET ORAL
Refills: 0 | DISCHARGE

## 2025-06-24 RX ORDER — HYDROMORPHONE/SOD CHLOR,ISO/PF 2 MG/10 ML
0.5 SYRINGE (ML) INJECTION
Refills: 0 | Status: DISCONTINUED | OUTPATIENT
Start: 2025-06-24 | End: 2025-06-24

## 2025-06-24 RX ADMIN — Medication 0.5 MILLIGRAM(S): at 13:52

## 2025-06-24 RX ADMIN — Medication 0.5 MILLIGRAM(S): at 13:02

## 2025-06-24 RX ADMIN — SODIUM CHLORIDE 75 MILLILITER(S): 9 INJECTION, SOLUTION INTRAVENOUS at 13:02

## 2025-06-24 NOTE — ASU PATIENT PROFILE, ADULT - NSICDXPASTMEDICALHX_GEN_ALL_CORE_FT
PAST MEDICAL HISTORY:  Borderline diabetes mellitus     BPH (benign prostatic hyperplasia)     Hyperlipidemia     Hypertension     Left inguinal hernia     JUAN FRANCISCO (obstructive sleep apnea) does not use CPAP    Right bundle branch block (RBBB) with left anterior hemiblock

## 2025-06-24 NOTE — ASU DISCHARGE PLAN (ADULT/PEDIATRIC) - NS MD DC FALL RISK RISK
For information on Fall & Injury Prevention, visit: [email or web address] OR  [email or web address] OR  https://www.cdc.gov/steadi/patient.html

## 2025-06-24 NOTE — ASU DISCHARGE PLAN (ADULT/PEDIATRIC) - FINANCIAL ASSISTANCE
[facility] provides services at a reduced cost to those who are determined to be eligible through [facility]’s financial assistance program. Information regarding [facility]’s financial assistance program can be found by going to [email or web address] or by calling [phone].

## 2025-06-24 NOTE — CHART NOTE - NSCHARTNOTEFT_GEN_A_CORE
PACU ANESTHESIA ADMISSION NOTE      Procedure: Robot-assisted repair of right inguinal hernia using da Inna Xi      Post op diagnosis:  Right inguinal hernia        ____  Intubated  TV:______       Rate: ______      FiO2: ______    __x__  Patent Airway    __x__  Full return of protective reflexes    __x__  Full recovery from anesthesia / back to baseline status    Vitals:  T(C): 37.1 (06-24-25 @ 10:09), Max: 37.1 (06-24-25 @ 10:09)  HR: 93 (06-24-25 @ 10:09) (93 - 93)  BP: 153/84 (06-24-25 @ 10:09) (153/84 - 153/84)  RR: 14 (06-24-25 @ 10:09) (14 - 14)  SpO2: 98% (06-24-25 @ 10:09) (98% - 98%)    Mental Status:  __x__ Awake   ___x__ Alert   _____ Drowsy   _____ Sedated    Nausea/Vomiting:  __x__ NO  ______Yes,   See Post - Op Orders          Pain Scale (0-10):  _____    Treatment: ____ None    __x__ See Post - Op/PCA Orders    Post - Operative Fluids:   ____ Oral   __x__ See Post - Op Orders    Plan: Discharge:   __x__Home       _____Floor     _____Critical Care    _____  Other:_________________    Comments: Patient had smooth intraoperative event, no anesthesia complication.  PACU Vital signs: HR:     83       BP:    118    /   75       RR:       16      O2 Sat:   100    %     Temp  97.3

## 2025-06-24 NOTE — ASU PATIENT PROFILE, ADULT - FALL HARM RISK - RISK INTERVENTIONS

## 2025-06-24 NOTE — BRIEF OPERATIVE NOTE - NSICDXBRIEFPROCEDURE_GEN_ALL_CORE_FT
PROCEDURES:  Robot-assisted repair of right inguinal hernia using da Inna Xi 24-Jun-2025 12:33:50  Mumtaz Pyle

## 2025-06-30 DIAGNOSIS — E78.5 HYPERLIPIDEMIA, UNSPECIFIED: ICD-10-CM

## 2025-06-30 DIAGNOSIS — I45.10 UNSPECIFIED RIGHT BUNDLE-BRANCH BLOCK: ICD-10-CM

## 2025-06-30 DIAGNOSIS — K40.30 UNILATERAL INGUINAL HERNIA, WITH OBSTRUCTION, WITHOUT GANGRENE, NOT SPECIFIED AS RECURRENT: ICD-10-CM

## 2025-06-30 DIAGNOSIS — G47.33 OBSTRUCTIVE SLEEP APNEA (ADULT) (PEDIATRIC): ICD-10-CM

## 2025-06-30 DIAGNOSIS — N40.0 BENIGN PROSTATIC HYPERPLASIA WITHOUT LOWER URINARY TRACT SYMPTOMS: ICD-10-CM

## 2025-06-30 DIAGNOSIS — I10 ESSENTIAL (PRIMARY) HYPERTENSION: ICD-10-CM

## 2025-06-30 DIAGNOSIS — Z88.0 ALLERGY STATUS TO PENICILLIN: ICD-10-CM

## 2025-07-02 ENCOUNTER — APPOINTMENT (OUTPATIENT)
Dept: SURGERY | Facility: CLINIC | Age: 75
End: 2025-07-02

## 2025-07-02 VITALS
RESPIRATION RATE: 18 BRPM | WEIGHT: 210 LBS | OXYGEN SATURATION: 99 % | BODY MASS INDEX: 30.06 KG/M2 | HEART RATE: 78 BPM | HEIGHT: 70 IN | TEMPERATURE: 97.2 F | SYSTOLIC BLOOD PRESSURE: 138 MMHG | DIASTOLIC BLOOD PRESSURE: 83 MMHG

## 2025-07-02 PROCEDURE — 99213 OFFICE O/P EST LOW 20 MIN: CPT | Mod: 24

## 2025-07-23 ENCOUNTER — APPOINTMENT (OUTPATIENT)
Dept: SURGERY | Facility: CLINIC | Age: 75
End: 2025-07-23
Payer: MEDICARE

## 2025-07-23 VITALS
HEIGHT: 70 IN | DIASTOLIC BLOOD PRESSURE: 80 MMHG | TEMPERATURE: 97.6 F | HEART RATE: 76 BPM | RESPIRATION RATE: 16 BRPM | BODY MASS INDEX: 30.06 KG/M2 | SYSTOLIC BLOOD PRESSURE: 128 MMHG | OXYGEN SATURATION: 93 % | WEIGHT: 210 LBS

## 2025-07-23 PROCEDURE — 99213 OFFICE O/P EST LOW 20 MIN: CPT | Mod: 24

## 2025-08-06 ENCOUNTER — APPOINTMENT (OUTPATIENT)
Dept: UROLOGY | Facility: CLINIC | Age: 75
End: 2025-08-06
Payer: MEDICARE

## 2025-08-06 VITALS
HEIGHT: 70 IN | SYSTOLIC BLOOD PRESSURE: 161 MMHG | OXYGEN SATURATION: 97 % | HEART RATE: 97 BPM | BODY MASS INDEX: 30.35 KG/M2 | TEMPERATURE: 97.7 F | DIASTOLIC BLOOD PRESSURE: 84 MMHG | WEIGHT: 212 LBS

## 2025-08-06 DIAGNOSIS — K40.90 UNILATERAL INGUINAL HERNIA, W/OUT OBSTRUCTION OR GANGRENE, NOT SPECIFIED AS RECURRENT: ICD-10-CM

## 2025-08-06 DIAGNOSIS — R39.9 UNSPECIFIED SYMPTOMS AND SIGNS INVOLVING THE GENITOURINARY SYSTEM: ICD-10-CM

## 2025-08-06 DIAGNOSIS — N50.89 OTHER SPECIFIED DISORDERS OF THE MALE GENITAL ORGANS: ICD-10-CM

## 2025-08-06 PROCEDURE — 99204 OFFICE O/P NEW MOD 45 MIN: CPT

## 2025-08-07 LAB — PSA SERPL-MCNC: 7.03 NG/ML

## 2025-08-10 LAB
PSA FREE FLD-MCNC: 17 %
PSA FREE SERPL-MCNC: 1.18 NG/ML
PSA SERPL-MCNC: 7.03 NG/ML

## 2025-08-27 DIAGNOSIS — R97.20 ELEVATED PROSTATE, SPECIFIC ANTIGEN [PSA]: ICD-10-CM

## (undated) DEVICE — SUT MONOCRYL 4-0 27" PS-2 UNDYED

## (undated) DEVICE — XI SEAL UNIVERSIAL 5-12MM

## (undated) DEVICE — ELCTR STRYKER NEPTUNE SMOKE EVACUATION PENCIL (GREEN)

## (undated) DEVICE — SOL IRR POUR NS 0.9% 500ML

## (undated) DEVICE — MARKING PEN W RULER

## (undated) DEVICE — SUT VICRYL 0 27" UR-6

## (undated) DEVICE — PREP CHLORAPREP HI-LITE ORANGE 26ML

## (undated) DEVICE — DRAIN PENROSE .25" X 12" SILICONE

## (undated) DEVICE — D HELP - CLEARVIEW CLEARIFY SYSTEM

## (undated) DEVICE — VENODYNE/SCD SLEEVE CALF MEDIUM

## (undated) DEVICE — VISITEC 4X4

## (undated) DEVICE — SYR LUER LOK 10CC

## (undated) DEVICE — SUT POLYSORB 0 30" GS-23

## (undated) DEVICE — XI DRAPE ARM

## (undated) DEVICE — ELCTR GROUNDING PAD ADULT COVIDIEN

## (undated) DEVICE — SUT VLOC 180 2-0 6" GS-22 GREEN

## (undated) DEVICE — BLADE SURGICAL #15 CARBON

## (undated) DEVICE — GLV 7 PROTEXIS (WHITE)

## (undated) DEVICE — XI ARM NEEDLE DRIVER LARGE

## (undated) DEVICE — SUT VLOC 180 2-0 9" GS-22 GREEN

## (undated) DEVICE — TUBING STRYKER PNEUMOCLEAR SMOKE EVACUATION HIGH FLOW

## (undated) DEVICE — XI ARM SCISSOR MONO CURVED

## (undated) DEVICE — SUT POLYSORB 3-0 30" V-20 UNDYED

## (undated) DEVICE — BLADE SCALPEL SAFETYLOCK #15

## (undated) DEVICE — XI ARM FORCEP FENESTRATED BIPOLAR 8MM

## (undated) DEVICE — SYR ASEPTO

## (undated) DEVICE — DRAPE SPLIT SHEET 77" X 120"

## (undated) DEVICE — DRAPE TOWEL BLUE 17" X 24"

## (undated) DEVICE — NDL HYPO SAFE 22G X 1.5" (BLACK)

## (undated) DEVICE — GLV 7.5 DURAPRENE

## (undated) DEVICE — SOL IRR POUR H2O 250ML

## (undated) DEVICE — DRSG STERISTRIPS 0.5 X 4"

## (undated) DEVICE — DRAPE INSTRUMENT POUCH 6.75" X 11"

## (undated) DEVICE — NDL HYPO REGULAR BEVEL 22G X 1.5" (TURQUOISE)

## (undated) DEVICE — GLV 7.5 PROTEXIS (BLUE)

## (undated) DEVICE — XI OBTURATOR OPTICAL BLADELESS 8MM

## (undated) DEVICE — SUT POLYSORB 3-0 18" TIES UNDYED

## (undated) DEVICE — XI DRAPE COLUMN

## (undated) DEVICE — WARMING BLANKET UPPER ADULT

## (undated) DEVICE — PACK GENERAL LAPAROSCOPY

## (undated) DEVICE — SUT POLYSORB 0 30" GS-21 UNDYED

## (undated) DEVICE — MEDICATION LABELS W MARKER

## (undated) DEVICE — DRSG TEGADERM + PAD 3.5X4"

## (undated) DEVICE — PACK ADULT HERNIA

## (undated) DEVICE — XI SCISSOR TIP COVER

## (undated) DEVICE — SPECIMEN CONTAINER 100ML

## (undated) DEVICE — INSUFFLATION NDL ETHICON ENDOPATH VERESS 14G 120MM

## (undated) DEVICE — DRAPE 3/4 SHEET 52X76"